# Patient Record
Sex: MALE | Race: BLACK OR AFRICAN AMERICAN | NOT HISPANIC OR LATINO | Employment: UNEMPLOYED | URBAN - METROPOLITAN AREA
[De-identification: names, ages, dates, MRNs, and addresses within clinical notes are randomized per-mention and may not be internally consistent; named-entity substitution may affect disease eponyms.]

---

## 2017-03-30 ENCOUNTER — ALLSCRIPTS OFFICE VISIT (OUTPATIENT)
Dept: OTHER | Facility: OTHER | Age: 42
End: 2017-03-30

## 2017-03-30 DIAGNOSIS — Z00.00 ENCOUNTER FOR GENERAL ADULT MEDICAL EXAMINATION WITHOUT ABNORMAL FINDINGS: ICD-10-CM

## 2017-03-30 DIAGNOSIS — Z13.6 ENCOUNTER FOR SCREENING FOR CARDIOVASCULAR DISORDERS: ICD-10-CM

## 2017-03-30 DIAGNOSIS — Z13.1 ENCOUNTER FOR SCREENING FOR DIABETES MELLITUS: ICD-10-CM

## 2017-03-31 ENCOUNTER — ALLSCRIPTS OFFICE VISIT (OUTPATIENT)
Dept: OTHER | Facility: OTHER | Age: 42
End: 2017-03-31

## 2017-08-08 ENCOUNTER — APPOINTMENT (EMERGENCY)
Dept: RADIOLOGY | Facility: HOSPITAL | Age: 42
End: 2017-08-08
Payer: COMMERCIAL

## 2017-08-08 ENCOUNTER — HOSPITAL ENCOUNTER (EMERGENCY)
Facility: HOSPITAL | Age: 42
Discharge: HOME | End: 2017-08-08
Attending: EMERGENCY MEDICINE | Admitting: EMERGENCY MEDICINE
Payer: COMMERCIAL

## 2017-08-08 VITALS
HEART RATE: 80 BPM | WEIGHT: 140 LBS | OXYGEN SATURATION: 97 % | RESPIRATION RATE: 16 BRPM | DIASTOLIC BLOOD PRESSURE: 64 MMHG | TEMPERATURE: 98.4 F | SYSTOLIC BLOOD PRESSURE: 131 MMHG

## 2017-08-08 DIAGNOSIS — R05.9 COUGH: ICD-10-CM

## 2017-08-08 DIAGNOSIS — J45.901 ASTHMA EXACERBATION: Primary | ICD-10-CM

## 2017-08-08 LAB
ANION GAP SERPL CALCULATED.3IONS-SCNC: 9 MMOL/L (ref 4–13)
BASOPHILS # BLD AUTO: 0.01 THOUSANDS/ΜL (ref 0–0.1)
BASOPHILS NFR BLD AUTO: 0 % (ref 0–1)
BUN SERPL-MCNC: 13 MG/DL (ref 5–25)
CALCIUM SERPL-MCNC: 8.9 MG/DL (ref 8.3–10.1)
CHLORIDE SERPL-SCNC: 100 MMOL/L (ref 100–108)
CO2 SERPL-SCNC: 29 MMOL/L (ref 21–32)
CREAT SERPL-MCNC: 1.09 MG/DL (ref 0.6–1.3)
EOSINOPHIL # BLD AUTO: 0.2 THOUSAND/ΜL (ref 0–0.61)
EOSINOPHIL NFR BLD AUTO: 2 % (ref 0–6)
ERYTHROCYTE [DISTWIDTH] IN BLOOD BY AUTOMATED COUNT: 14.4 % (ref 11.6–15.1)
GFR SERPL CREATININE-BSD FRML MDRD: 96 ML/MIN/1.73SQ M
GLUCOSE SERPL-MCNC: 109 MG/DL (ref 65–140)
HCT VFR BLD AUTO: 39.7 % (ref 36.5–49.3)
HGB BLD-MCNC: 14 G/DL (ref 12–17)
LYMPHOCYTES # BLD AUTO: 1.94 THOUSANDS/ΜL (ref 0.6–4.47)
LYMPHOCYTES NFR BLD AUTO: 23 % (ref 14–44)
MAGNESIUM SERPL-MCNC: 2 MG/DL (ref 1.6–2.6)
MCH RBC QN AUTO: 31.4 PG (ref 26.8–34.3)
MCHC RBC AUTO-ENTMCNC: 35.3 G/DL (ref 31.4–37.4)
MCV RBC AUTO: 89 FL (ref 82–98)
MONOCYTES # BLD AUTO: 0.79 THOUSAND/ΜL (ref 0.17–1.22)
MONOCYTES NFR BLD AUTO: 10 % (ref 4–12)
NEUTROPHILS # BLD AUTO: 5.39 THOUSANDS/ΜL (ref 1.85–7.62)
NEUTS SEG NFR BLD AUTO: 65 % (ref 43–75)
NRBC BLD AUTO-RTO: 0 /100 WBCS
PLATELET # BLD AUTO: 223 THOUSANDS/UL (ref 149–390)
PMV BLD AUTO: 10.4 FL (ref 8.9–12.7)
POTASSIUM SERPL-SCNC: 4.1 MMOL/L (ref 3.5–5.3)
RBC # BLD AUTO: 4.46 MILLION/UL (ref 3.88–5.62)
SODIUM SERPL-SCNC: 138 MMOL/L (ref 136–145)
WBC # BLD AUTO: 8.33 THOUSAND/UL (ref 4.31–10.16)

## 2017-08-08 PROCEDURE — 94640 AIRWAY INHALATION TREATMENT: CPT

## 2017-08-08 PROCEDURE — 99284 EMERGENCY DEPT VISIT MOD MDM: CPT

## 2017-08-08 PROCEDURE — 83735 ASSAY OF MAGNESIUM: CPT | Performed by: EMERGENCY MEDICINE

## 2017-08-08 PROCEDURE — 71020 HB CHEST X-RAY 2VW FRONTAL&LATL: CPT

## 2017-08-08 PROCEDURE — 85025 COMPLETE CBC W/AUTO DIFF WBC: CPT | Performed by: EMERGENCY MEDICINE

## 2017-08-08 PROCEDURE — 80048 BASIC METABOLIC PNL TOTAL CA: CPT | Performed by: EMERGENCY MEDICINE

## 2017-08-08 PROCEDURE — 96375 TX/PRO/DX INJ NEW DRUG ADDON: CPT

## 2017-08-08 PROCEDURE — 96374 THER/PROPH/DIAG INJ IV PUSH: CPT

## 2017-08-08 PROCEDURE — 36415 COLL VENOUS BLD VENIPUNCTURE: CPT | Performed by: EMERGENCY MEDICINE

## 2017-08-08 RX ORDER — BENZONATATE 100 MG/1
100 CAPSULE ORAL EVERY 8 HOURS
Qty: 21 CAPSULE | Refills: 0 | Status: SHIPPED | OUTPATIENT
Start: 2017-08-08 | End: 2018-03-13 | Stop reason: ALTCHOICE

## 2017-08-08 RX ORDER — PREDNISONE 20 MG/1
60 TABLET ORAL DAILY
Qty: 15 TABLET | Refills: 0 | Status: SHIPPED | OUTPATIENT
Start: 2017-08-08 | End: 2017-08-13

## 2017-08-08 RX ORDER — IPRATROPIUM BROMIDE AND ALBUTEROL SULFATE 2.5; .5 MG/3ML; MG/3ML
3 SOLUTION RESPIRATORY (INHALATION) ONCE
Status: COMPLETED | OUTPATIENT
Start: 2017-08-08 | End: 2017-08-08

## 2017-08-08 RX ORDER — DIPHENHYDRAMINE HYDROCHLORIDE 50 MG/ML
25 INJECTION INTRAMUSCULAR; INTRAVENOUS ONCE
Status: COMPLETED | OUTPATIENT
Start: 2017-08-08 | End: 2017-08-08

## 2017-08-08 RX ORDER — METHYLPREDNISOLONE SODIUM SUCCINATE 125 MG/2ML
125 INJECTION, POWDER, LYOPHILIZED, FOR SOLUTION INTRAMUSCULAR; INTRAVENOUS ONCE
Status: COMPLETED | OUTPATIENT
Start: 2017-08-08 | End: 2017-08-08

## 2017-08-08 RX ORDER — ALBUTEROL SULFATE 90 UG/1
2 AEROSOL, METERED RESPIRATORY (INHALATION) EVERY 6 HOURS PRN
COMMUNITY
End: 2018-01-29 | Stop reason: SDUPTHER

## 2017-08-08 RX ORDER — NAPROXEN 500 MG/1
500 TABLET ORAL 2 TIMES DAILY WITH MEALS
Qty: 10 TABLET | Refills: 0 | Status: SHIPPED | OUTPATIENT
Start: 2017-08-08 | End: 2018-07-09

## 2017-08-08 RX ORDER — METOCLOPRAMIDE HYDROCHLORIDE 5 MG/ML
10 INJECTION INTRAMUSCULAR; INTRAVENOUS ONCE
Status: COMPLETED | OUTPATIENT
Start: 2017-08-08 | End: 2017-08-08

## 2017-08-08 RX ORDER — KETOROLAC TROMETHAMINE 30 MG/ML
15 INJECTION, SOLUTION INTRAMUSCULAR; INTRAVENOUS ONCE
Status: COMPLETED | OUTPATIENT
Start: 2017-08-08 | End: 2017-08-08

## 2017-08-08 RX ADMIN — METOCLOPRAMIDE 10 MG: 5 INJECTION, SOLUTION INTRAMUSCULAR; INTRAVENOUS at 22:31

## 2017-08-08 RX ADMIN — DIPHENHYDRAMINE HYDROCHLORIDE 25 MG: 50 INJECTION, SOLUTION INTRAMUSCULAR; INTRAVENOUS at 22:27

## 2017-08-08 RX ADMIN — KETOROLAC TROMETHAMINE 15 MG: 30 INJECTION, SOLUTION INTRAMUSCULAR at 22:24

## 2017-08-08 RX ADMIN — METHYLPREDNISOLONE SODIUM SUCCINATE 125 MG: 125 INJECTION, POWDER, FOR SOLUTION INTRAMUSCULAR; INTRAVENOUS at 22:21

## 2017-08-08 RX ADMIN — IPRATROPIUM BROMIDE AND ALBUTEROL SULFATE 3 ML: .5; 3 SOLUTION RESPIRATORY (INHALATION) at 22:19

## 2018-01-12 NOTE — PROGRESS NOTES
Assessment    1  Encounter for preventive health examination (V70 0) (Z00 00)   2  Encounter for screening for diabetes mellitus (V77 1) (Z13 1)   3  Screening for cardiovascular condition (V81 2) (Z13 6)    Plan  Health Maintenance, Encounter for screening for diabetes mellitus    · (1) COMPREHENSIVE METABOLIC PANEL; Status:Active; Requested for:30Mar2017; Health Maintenance, Encounter for screening for diabetes mellitus, Screening for  cardiovascular condition    · (1) LIPID PANEL, FASTING; Status:Active; Requested for:30Mar2017;     Discussion/Summary  Impression: health maintenance visit  Currently, he eats a healthy diet and has an adequate exercise regimen  Prostate cancer screening: PSA is not indicated  Testicular cancer screening: the risks and benefits of testicular cancer screening were discussed and self testicular exam technique was taught  Colorectal cancer screening: colorectal cancer screening is not indicated  Screening lab work includes glucose and lipid profile  The immunizations are up to date  He was advised to be evaluated by a dentist  Advice and education were given regarding nutrition, aerobic exercise, sunscreen use, self skin examination and seat belt use  Pateint will come in tomorrow for PPD placement and then have it read on Monday  Chief Complaint  HERE TODAY FOR WELLNESS VISIT FOR [facility] AND [facility]      History of Present Illness  HM, Adult Male: The patient is being seen for a health maintenance evaluation  The last health maintenance visit was 2 year(s) ago  Social History: Household members include spouse and 3 kids  He is   Work status: working full time and occupation: security  The patient has never smoked cigarettes  He reports never drinking alcohol  He has never used illicit drugs  General Health: The patient's health since the last visit is described as good  He has regular dental visits  He denies vision problems   He denies hearing loss  Immunizations status: up to date  Lifestyle:  He consumes a diverse and healthy diet  He has weight concerns  He exercises regularly  He does not use tobacco  He consumes alcohol  He denies drug use  Reproductive health:  the patient is sexually active  Screening: Prostate cancer screening includes no previous evaluation  Testicular cancer screening includes monthly self testicular examinations  Colorectal cancer screening includes no previous screening  Metabolic screening includes no previous lipid profile and no previous glucose screening  HPI: Patient is in the process of adopting his wife's cousin's child They have been raising the child since 6 months of age and kid is now 1 Patient also needs a PE form for the [employer] he works at as a  Patient is overall doing well He is taking his asthma medicaitons without issues      Review of Systems    Constitutional: No fever or chills, feels well, no tiredness, no recent weight gain or weight loss  Eyes: no eye pain and no eyesight problems  ENT: no complaints of earache, no hearing loss, no nosebleeds, no nasal discharge, no sore throat, no hoarseness  Cardiovascular: no chest pain, no intermittent leg claudication, no palpitations and no extremity edema  Respiratory: No complaints of shortness of breath, no wheezing, no cough, no SOB on exertion, no orthopnea or PND  Gastrointestinal: No complaints of abdominal pain, no constipation, no nausea or vomiting, no diarrhea or bloody stools  Genitourinary: no dysuria and no incontinence  Musculoskeletal: no arthralgias and no myalgias  Integumentary: no rashes  Neurological: No compliants of headache, no confusion, no convulsions, no numbness or tingling, no dizziness or fainting, no limb weakness, no difficulty walking  Psychiatric: no anxiety, no sleep disturbances and no depression  Active Problems    1   Moderate persistent asthma (493 90) (J45 40)    Past Medical History    · History of Shoulder dislocation (831 00) (S43 006A)    Family History  Mother    · Family history of Asthma   · Family history of Drug abuse  Father    · Family history of Drug abuse    Social History    · Caffeine use (V49 89) (F15 90)   · 2 CUPS DAILY  · Never smoker   · No alcohol use    Current Meds   1  Advair Diskus 250-50 MCG/DOSE Inhalation Aerosol Powder Breath Activated; INHALE   1 PUFF TWICE DAILY  Requested for: 19Oct2016; Last Rx:19Oct2016 Ordered   2  Albuterol Sulfate (2 5 MG/3ML) 0 083% Inhalation Nebulization Solution; USE 1 UNIT   DOSE IN NEBULIZER EVERY 4 TO 6 HOURS AS NEEDED  Requested for:   [id number]; Last Rx:[id number] Ordered   3  Ventolin  (90 Base) MCG/ACT Inhalation Aerosol Solution; INHALE 2 PUFFS   EVERY 6 HOURS AS NEEDED COUGH AND WHEEZING; Therapy: [id number] to (Evaluate:28Jun2017)  Requested for: 20Mar2017; Last   Rx:20Mar2017 Ordered    Allergies    1  Influenza Virus Vaccine INJ    Vitals   Recorded: [id number] 03:46PM   Temperature 60 0 F   Systolic 319   Diastolic 70   Height 5 ft 9 in   Weight 165 lb    BMI Calculated 24 37   BSA Calculated 1 9     Physical Exam    Constitutional   General appearance: No acute distress, well appearing and well nourished  Head and Face   Head and face: Normal     Eyes   Conjunctiva and lids: No erythema, swelling or discharge  Pupils and irises: Equal, round, reactive to light  Ears, Nose, Mouth, and Throat   External inspection of ears and nose: Normal     Otoscopic examination: Tympanic membranes translucent with normal light reflex  Canals patent without erythema  Hearing: Normal     Nasal mucosa, septum, and turbinates: Normal without edema or erythema  Lips, teeth, and gums: Normal, good dentition  Oropharynx: Normal with no erythema, edema, exudate or lesions  Neck   Neck: Supple, symmetric, trachea midline, no masses  Thyroid: Normal, no thyromegaly      Pulmonary Respiratory effort: No increased work of breathing or signs of respiratory distress  Auscultation of lungs: Clear to auscultation  Cardiovascular   Auscultation of heart: Normal rate and rhythm, normal S1 and S2, no murmurs  Carotid pulses: 2+ bilaterally  Examination of extremities for edema and/or varicosities: Normal     Abdomen   Abdomen: Non-tender, no masses  Liver and spleen: No hepatomegaly or splenomegaly  Lymphatic   Palpation of lymph nodes in neck: No lymphadenopathy  Musculoskeletal   Gait and station: Normal     Skin   Skin and subcutaneous tissue: Normal without rashes or lesions  Neurologic   Cranial nerves: Cranial nerves 2-12 intact  Cortical function: Normal mental status  Reflexes: 2+ and symmetric  Sensation: No sensory loss  Coordination: Normal finger to nose and heel to shin  Psychiatric   Judgment and insight: Normal     Orientation to person, place and time: Normal     Recent and remote memory: Intact  Mood and affect: Normal        Procedure    Procedure: Visual Acuity Test    Indication: routine screening  Inforrmation supplied by a Snellen chart  Results: 20/30 in the right eye without corrective device, 20/40 in the left eye without corrective device   Color vision was screened with the Ishihara Test and the results were normal    The patient was cooperative, but tolerated the procedure well        Signatures   Electronically signed by : Angela Nino DO; Mar 30 2017  4:09PM EST                       (Author)

## 2018-01-13 VITALS
SYSTOLIC BLOOD PRESSURE: 130 MMHG | HEIGHT: 69 IN | BODY MASS INDEX: 24.44 KG/M2 | DIASTOLIC BLOOD PRESSURE: 70 MMHG | WEIGHT: 165 LBS | TEMPERATURE: 97.5 F

## 2018-01-16 NOTE — PROGRESS NOTES
Chief Complaint  Here for PPD test-Tubersol 0 1ml-intradermally-left forearm      Active Problems    1  Encounter for screening for diabetes mellitus (V77 1) (Z13 1)   2  Moderate persistent asthma (493 90) (J45 40)   3  Screening for cardiovascular condition (V81 2) (Z13 6)    Current Meds   1  Advair Diskus 250-50 MCG/DOSE Inhalation Aerosol Powder Breath Activated; INHALE   1 PUFF TWICE DAILY  Requested for: 19Oct2016; Last Rx:19Oct2016 Ordered   2  Albuterol Sulfate (2 5 MG/3ML) 0 083% Inhalation Nebulization Solution; USE 1 UNIT   DOSE IN NEBULIZER EVERY 4 TO 6 HOURS AS NEEDED  Requested for:   15Sep2015; Last Rx:[id number] Ordered   3  Ventolin  (90 Base) MCG/ACT Inhalation Aerosol Solution; INHALE 2 PUFFS   EVERY 6 HOURS AS NEEDED COUGH AND WHEEZING; Therapy: [id number] to (Evaluate:28Jun2017)  Requested for: 20Mar2017; Last   Rx:20Mar2017 Ordered    Allergies    1   Influenza Virus Vaccine INJ    Plan  Encounter for PPD test    · Tubersol 5 UNIT/0 1ML Intradermal Solution    Signatures   Electronically signed by : Francisco Martinez DO; Mar 31 2017  2:24PM EST                       (Author)

## 2018-01-29 DIAGNOSIS — J45.20 ASTHMA IN ADULT, MILD INTERMITTENT, UNCOMPLICATED: Primary | ICD-10-CM

## 2018-01-30 DIAGNOSIS — J45.20 ASTHMA IN ADULT, MILD INTERMITTENT, UNCOMPLICATED: ICD-10-CM

## 2018-02-09 DIAGNOSIS — J45.20 ASTHMA IN ADULT, MILD INTERMITTENT, UNCOMPLICATED: ICD-10-CM

## 2018-03-07 DIAGNOSIS — J45.20 ASTHMA IN ADULT, MILD INTERMITTENT, UNCOMPLICATED: ICD-10-CM

## 2018-03-07 NOTE — PROGRESS NOTES
History of Present Illness    Revaccination   Vaccine Information: Vaccine(s) Given (names): ADACEL  Unable to reach by phone  Spoke with patient regarding vaccine out of temperature range  Action(s): Revaccination declined  Pt called (attempt 1): [id number] 105 BL  Pt called (attempt 2): [id number] 1100 BL  Other Information: LEFT VOICE MAIL  Active Problems    1  Moderate persistent asthma (493 90) (J45 40)    Immunizations  Influenza --- Ravin Riggins: Temporarily Deferred: Pt refuses   PPD --- Ravin Louht: 21-Jan-2015     Current Meds   1  Advair Diskus 250-50 MCG/DOSE Inhalation Aerosol Powder Breath Activated; INHALE 1   PUFF TWICE DAILY   2  Albuterol Sulfate (2 5 MG/3ML) 0 083% Inhalation Nebulization Solution; USE 1 UNIT   DOSE IN NEBULIZER EVERY 4 TO 6 HOURS AS NEEDED   3  Ventolin  (90 Base) MCG/ACT Inhalation Aerosol Solution; INHALE 2 PUFFS   EVERY 6 HOURS AS NEEDED COUGH AND WHEEZING    Allergies    1  Influenza Virus Vaccine INJ    Plan    1   Tdap    Signatures   Electronically signed by : Bridget Galvan DO; Jan 17 2017 11:52AM EST                       (Author)

## 2018-03-13 ENCOUNTER — OFFICE VISIT (OUTPATIENT)
Dept: FAMILY MEDICINE CLINIC | Facility: CLINIC | Age: 43
End: 2018-03-13
Payer: COMMERCIAL

## 2018-03-13 VITALS — BODY MASS INDEX: 23.18 KG/M2 | WEIGHT: 157 LBS | SYSTOLIC BLOOD PRESSURE: 118 MMHG | DIASTOLIC BLOOD PRESSURE: 78 MMHG

## 2018-03-13 DIAGNOSIS — J45.40 MODERATE PERSISTENT ASTHMA WITHOUT COMPLICATION: Primary | ICD-10-CM

## 2018-03-13 DIAGNOSIS — J45.20 ASTHMA IN ADULT, MILD INTERMITTENT, UNCOMPLICATED: ICD-10-CM

## 2018-03-13 PROCEDURE — 99213 OFFICE O/P EST LOW 20 MIN: CPT | Performed by: FAMILY MEDICINE

## 2018-03-13 RX ORDER — ALBUTEROL SULFATE 90 UG/1
AEROSOL, METERED RESPIRATORY (INHALATION)
Qty: 18 INHALER | Refills: 0 | Status: SHIPPED | OUTPATIENT
Start: 2018-03-13 | End: 2018-04-18 | Stop reason: SDUPTHER

## 2018-03-13 NOTE — ASSESSMENT & PLAN NOTE
Patient asthma is well controlled He has only been needing the rescue inhaler about 1 time per week Patient declines flu shot patient will continue advair daily and use ventolin as needed

## 2018-03-13 NOTE — PATIENT INSTRUCTIONS
Asthma   AMBULATORY CARE:   Asthma  is a lung disease that makes breathing difficult  Chronic inflammation and reactions to triggers narrow the airways in your lungs  Asthma can become life-threatening if it is not managed  Cough-variant asthma  is a type of asthma that causes a dry cough that keeps coming back  A dry cough may be your only symptom, or you may also have chest tightness  These symptoms may be caused by exercise or exposure to odors, allergens, or respiratory tract infections  Cough-variant asthma is treated the same way as typical asthma  Common symptoms include the following:   · Coughing     · Wheezing     · Shortness of breath     · Chest tightness  Seek care immediately if:   · You have severe shortness of breath  · Your lips or nails turn blue or gray  · The skin around your neck and ribs pulls in with each breath  · You have shortness of breath, even after you take your short-term medicine as directed  · Your peak flow numbers are in the red zone of your AAP  Contact your healthcare provider if:   · You run out of medicine before your next refill is due  · Your symptoms get worse  · You need to take more medicine than usual to control your symptoms  · You have questions or concerns about your condition or care  Treatment for asthma  will depend on how severe your asthma is  Medicine may decrease inflammation, open airways, and make it easier to breathe  Medicines may be inhaled, taken as a pill, or injected  Short-term medicines relieve your symptoms quickly  Long-term medicines are used to prevent future attacks  You may also need medicine to help control your allergies  Manage and prevent future asthma attacks:   · Follow your asthma action plan  This is a written plan that you and your healthcare provider create  It explains which medicine you need and when to change doses if necessary   It also explains how you can monitor symptoms and use a peak flow meter  The meter measures how well your lungs are working  · Manage other health conditions , such as allergies, acid reflux, and sleep apnea  · Identify and avoid triggers  These may include pets, dust mites, mold, and cockroaches  · Do not smoke or be around others who smoke  Nicotine and other chemicals in cigarettes and cigars can cause lung damage  Ask your healthcare provider for information if you currently smoke and need help to quit  E-cigarettes or smokeless tobacco still contain nicotine  Talk to your healthcare provider before you use these products  · Ask about the flu vaccine  The flu can make your asthma worse  You may need a yearly flu shot  Follow up with your healthcare provider as directed: You will need to return to make sure your medicine is working and your symptoms are controlled  You may be referred to an asthma or allergy specialist  Liang Nava may be asked to keep a record of your peak flow values and bring it with you to your appointments  Write down your questions so you remember to ask them during your visits  © 2017 [address] Information is for End User's use only and may not be sold, redistributed or otherwise used for commercial purposes  All illustrations and images included in CareNotes® are the copyrighted property of [employer]  or [employer]  The above information is an  only  It is not intended as medical advice for individual conditions or treatments  Talk to your doctor, nurse or pharmacist before following any medical regimen to see if it is safe and effective for you

## 2018-03-13 NOTE — PROGRESS NOTES
Assessment/Plan: Moderate persistent asthma  Patient asthma is well controlled He has only been needing the rescue inhaler about 1 time per week Patient declines flu shot patient will continue advair daily and use ventolin as needed       Diagnoses and all orders for this visit:    Moderate persistent asthma without complication    Asthma in adult, mild intermittent, uncomplicated  -     albuterol (VENTOLIN HFA) 90 mcg/act inhaler; 2 puffs every 6 hours as needed for wheezing/coughing  -     fluticasone-salmeterol (ADVAIR DISKUS) 250-50 mcg/dose inhaler; Inhale 1 puff 2 (two) times a day    Other orders  -     Discontinue: fluticasone-salmeterol (ADVAIR DISKUS) 250-50 mcg/dose inhaler; Inhale 1 puff 2 (two) times a day          Subjective:   Chief Complaint   Patient presents with    Follow-up     asthma          Patient ID: Parrish Valencia is a 38 y o  male  Patient is here for followup of her asthma Patient has been doing well He is exercising often Patient is a resource officer at [employer] Patient has been using the rescue inhaler about 1 time per week patient is using advair daily patient refuses to have flu shot      Asthma   There is no chest tightness, cough, difficulty breathing, frequent throat clearing, hemoptysis, hoarse voice, shortness of breath, sputum production or wheezing  This is a recurrent problem  The current episode started more than 1 year ago  The problem occurs rarely  The problem has been unchanged  Pertinent negatives include no appetite change, chest pain, dyspnea on exertion, ear congestion, ear pain, fever, headaches, heartburn, malaise/fatigue, myalgias, nasal congestion, orthopnea, PND, postnasal drip, rhinorrhea, sneezing, sore throat, sweats, trouble swallowing or weight loss  His symptoms are aggravated by pollen and exposure to smoke  His symptoms are alleviated by beta-agonist  He reports significant improvement on treatment   There are no known risk factors for lung disease  His past medical history is significant for asthma  There is no history of bronchiectasis, bronchitis, COPD, emphysema or pneumonia  The following portions of the patient's history were reviewed and updated as appropriate: allergies, current medications, past social history and problem list     Review of Systems   Constitutional: Negative for appetite change, fatigue, fever, malaise/fatigue, unexpected weight change and weight loss  HENT: Negative for congestion, ear pain, hoarse voice, postnasal drip, rhinorrhea, sinus pain, sneezing, sore throat and trouble swallowing  Eyes: Negative for discharge and visual disturbance  Respiratory: Negative for cough, hemoptysis, sputum production, chest tightness, shortness of breath and wheezing  Cardiovascular: Negative for chest pain, dyspnea on exertion, palpitations, leg swelling and PND  Gastrointestinal: Negative for abdominal pain, blood in stool, constipation, diarrhea, heartburn, nausea and vomiting  Genitourinary: Negative for difficulty urinating, dysuria, frequency and hematuria  Musculoskeletal: Negative for arthralgias, gait problem, joint swelling and myalgias  Skin: Negative for rash and wound  Allergic/Immunologic: Negative for environmental allergies and food allergies  Neurological: Negative for dizziness, syncope, weakness, numbness and headaches  Hematological: Negative for adenopathy  Does not bruise/bleed easily  Psychiatric/Behavioral: Negative for confusion, decreased concentration and sleep disturbance  The patient is not nervous/anxious  Objective:      /78   Wt 71 2 kg (157 lb)   BMI 23 18 kg/m²          Physical Exam   Constitutional: He is oriented to person, place, and time  He appears well-developed and well-nourished  HENT:   Head: Normocephalic and atraumatic     Right Ear: Hearing, tympanic membrane and external ear normal    Left Ear: Hearing, tympanic membrane and external ear normal    Eyes: Conjunctivae and EOM are normal  Pupils are equal, round, and reactive to light  Neck: Neck supple  No thyromegaly present  Cardiovascular: Normal rate and normal heart sounds  Pulmonary/Chest: Effort normal and breath sounds normal  He has no wheezes  He has no rales  Abdominal: Soft  Bowel sounds are normal  He exhibits no distension  There is no tenderness  Musculoskeletal: He exhibits no edema or tenderness  Lymphadenopathy:     He has no cervical adenopathy  Neurological: He is alert and oriented to person, place, and time  No cranial nerve deficit  Coordination normal    Skin: Skin is warm and dry  No rash noted  Psychiatric: He has a normal mood and affect   His behavior is normal  Judgment and thought content normal

## 2018-04-18 DIAGNOSIS — J45.20 ASTHMA IN ADULT, MILD INTERMITTENT, UNCOMPLICATED: ICD-10-CM

## 2018-05-07 DIAGNOSIS — J45.20 ASTHMA IN ADULT, MILD INTERMITTENT, UNCOMPLICATED: ICD-10-CM

## 2018-05-08 DIAGNOSIS — J45.20 ASTHMA IN ADULT, MILD INTERMITTENT, UNCOMPLICATED: ICD-10-CM

## 2018-06-18 DIAGNOSIS — J45.20 ASTHMA IN ADULT, MILD INTERMITTENT, UNCOMPLICATED: ICD-10-CM

## 2018-07-09 ENCOUNTER — HOSPITAL ENCOUNTER (EMERGENCY)
Facility: HOSPITAL | Age: 43
Discharge: HOME | End: 2018-07-09
Attending: EMERGENCY MEDICINE
Payer: COMMERCIAL

## 2018-07-09 ENCOUNTER — APPOINTMENT (EMERGENCY)
Dept: RADIOLOGY | Facility: HOSPITAL | Age: 43
End: 2018-07-09
Payer: COMMERCIAL

## 2018-07-09 VITALS
OXYGEN SATURATION: 98 % | RESPIRATION RATE: 16 BRPM | SYSTOLIC BLOOD PRESSURE: 125 MMHG | HEART RATE: 76 BPM | DIASTOLIC BLOOD PRESSURE: 73 MMHG | TEMPERATURE: 97 F

## 2018-07-09 DIAGNOSIS — M24.411 SHOULDER DISLOCATION, RECURRENT, RIGHT: Primary | ICD-10-CM

## 2018-07-09 PROCEDURE — 96375 TX/PRO/DX INJ NEW DRUG ADDON: CPT

## 2018-07-09 PROCEDURE — 96374 THER/PROPH/DIAG INJ IV PUSH: CPT

## 2018-07-09 PROCEDURE — 73030 X-RAY EXAM OF SHOULDER: CPT

## 2018-07-09 PROCEDURE — 99283 EMERGENCY DEPT VISIT LOW MDM: CPT

## 2018-07-09 RX ORDER — FENTANYL CITRATE 50 UG/ML
100 INJECTION, SOLUTION INTRAMUSCULAR; INTRAVENOUS ONCE
Status: COMPLETED | OUTPATIENT
Start: 2018-07-09 | End: 2018-07-09

## 2018-07-09 RX ORDER — FENTANYL CITRATE 50 UG/ML
INJECTION, SOLUTION INTRAMUSCULAR; INTRAVENOUS
Status: DISPENSED
Start: 2018-07-09 | End: 2018-07-09

## 2018-07-09 RX ORDER — ALBUTEROL SULFATE 90 UG/1
AEROSOL, METERED RESPIRATORY (INHALATION)
COMMUNITY
Start: 2006-01-13 | End: 2018-08-01 | Stop reason: SDUPTHER

## 2018-07-09 RX ORDER — DIAZEPAM 5 MG/ML
5 INJECTION, SOLUTION INTRAMUSCULAR; INTRAVENOUS ONCE
Status: COMPLETED | OUTPATIENT
Start: 2018-07-09 | End: 2018-07-09

## 2018-07-09 RX ORDER — DIAZEPAM 5 MG/ML
INJECTION, SOLUTION INTRAMUSCULAR; INTRAVENOUS
Status: DISPENSED
Start: 2018-07-09 | End: 2018-07-09

## 2018-07-09 RX ADMIN — FENTANYL CITRATE 100 MCG: 50 INJECTION, SOLUTION INTRAMUSCULAR; INTRAVENOUS at 08:28

## 2018-07-09 RX ADMIN — DIAZEPAM 5 MG: 5 INJECTION, SOLUTION INTRAMUSCULAR; INTRAVENOUS at 08:39

## 2018-07-09 NOTE — ED PROVIDER NOTES
History  Chief Complaint   Patient presents with    Shoulder Pain     patient states was gurgling while brushing his teeth, leaned forward to spit, and dislocated right shoulder  Has h/o same     Patient presents with complaint of right shoulder pain and probable dislocation  He states that he was brushing his teeth just PTA and when he leaned forward to spit the shoulder dislocated  He has a history of this multiple times in the past   Complains of localized pain in his right shoulder only  Denies any other issues or complaints at this time  Prior to Admission Medications   Prescriptions Last Dose Informant Patient Reported? Taking? PROAIR  (90 Base) MCG/ACT inhaler   No No   Si PUFFS EVERY 6 HOURS AS NEEDED FOR WHEEZING/COUGHING   albuterol (PROVENTIL HFA,VENTOLIN HFA) 90 mcg/act inhaler   Yes Yes   Si puffs qid prn   fluticasone-salmeterol (ADVAIR DISKUS) 100-50 mcg/dose inhaler   Yes Yes   Si puffs qd   fluticasone-salmeterol (ADVAIR DISKUS) 250-50 mcg/dose inhaler   No No   Sig: Inhale 1 puff 2 (two) times a day      Facility-Administered Medications: None       Past Medical History:   Diagnosis Date    Asthma     Shoulder dislocation        History reviewed  No pertinent surgical history  Family History   Problem Relation Age of Onset    Asthma Mother     No Known Problems Father      I have reviewed and agree with the history as documented  Social History   Substance Use Topics    Smoking status: Never Smoker    Smokeless tobacco: Never Used    Alcohol use No        Review of Systems   Constitutional: Negative  HENT: Negative  Eyes: Negative  Respiratory: Negative  Cardiovascular: Negative  Gastrointestinal: Negative  Musculoskeletal: Positive for arthralgias (right shoulder)  Negative for back pain, gait problem, joint swelling and neck pain  Skin: Negative  Neurological: Negative  Hematological: Negative          Physical Exam  Physical Exam   Constitutional: He is oriented to person, place, and time  He appears well-developed and well-nourished  HENT:   Head: Normocephalic and atraumatic  Eyes: Pupils are equal, round, and reactive to light  Neck: Neck supple  Cardiovascular: Normal rate and regular rhythm  Pulmonary/Chest: Effort normal and breath sounds normal    Abdominal: Soft  Musculoskeletal: He exhibits deformity  Right shoulder: He exhibits decreased range of motion and tenderness  Arms:  Neurological: He is alert and oriented to person, place, and time  Skin: Skin is warm and dry  Capillary refill takes less than 2 seconds  Psychiatric: He has a normal mood and affect  Nursing note and vitals reviewed  Vital Signs  ED Triage Vitals [07/09/18 0813]   Temperature Pulse Respirations Blood Pressure SpO2   (!) 97 °F (36 1 °C) 82 (!) 28 140/87 98 %      Temp Source Heart Rate Source Patient Position - Orthostatic VS BP Location FiO2 (%)   Temporal Monitor Sitting Left arm --      Pain Score       Worst Possible Pain           Vitals:    07/09/18 0813 07/09/18 0839 07/09/18 0943   BP: 140/87 134/72 125/73   Pulse: 82 78 76   Patient Position - Orthostatic VS: Sitting         Visual Acuity      ED Medications  Medications   fentanyl citrate (PF) 100 MCG/2ML 100 mcg (100 mcg Intravenous Given 7/9/18 0828)   diazepam (VALIUM) injection 5 mg (5 mg Intravenous Given 7/9/18 0839)       Diagnostic Studies  Results Reviewed     None                 XR shoulder 2+ views RIGHT   Final Result by Kalyn Hurd MD (07/09 1000)      No acute osseous abnormality  Flattening of the lateral humeral head suggestive of old Hill-Sachs lesion              Workstation performed: [id number]                    Procedures  Orthopedic Injury  Date/Time: 7/9/2018 8:48 AM  Performed by: Shelia Vinson  Authorized by: Shelia Vinson     Patient Location:  ED  Other Assisting Provider: Yes (comment) Altaf Lopez Shira)    Verbal consent obtained?: Yes    Risks and benefits: Risks, benefits and alternatives were discussed    Consent given by:  Patient and spouse  Patient states understanding of procedure being performed: Yes    Patient's understanding of procedure matches consent: Yes    Patient identity confirmed:  Verbally with patient and arm band  Injury location:  Shoulder  Location details:  Right shoulder  Injury type:  Dislocation  Dislocation type: anterior    Chronicity:  Recurrent  Neurovascular status: Neurovascularly intact    Distal perfusion: normal    Neurological function: normal    Range of motion: reduced    Local anesthesia used?: No    General anesthesia used?: No    Sedation type: Anxiolysis  Manipulation performed?: Yes    Reduction method:  Scapular manipulation and traction and counter traction  Reduction method:  Scapular manipulation and traction and counter traction  Reduction method:  Scapular manipulation and traction and counter traction  Reduction method:  Scapular manipulation and traction and counter traction  Reduction method:  Scapular manipulation and traction and counter traction  Reduction method:  Scapular manipulation and traction and counter traction  Reduction successful?: Yes    Confirmation: Reduction confirmed by x-ray    Immobilization:  Other (comment) (shoulder immobilizer)  Neurovascular status: Neurovascularly intact    Distal perfusion: normal    Neurological function: normal    Patient tolerance:  Patient tolerated the procedure well with no immediate complications           Phone Contacts  ED Phone Contact    ED Course  ED Course as of Jul 09 1116   Mon Jul 09, 2018   7656 Patient continues to wake up after receiving the fentanyl and valium  Vitals are stable  Discussed the importance of Ortho follow-up  The patient reports that he has been told past to have surgery but has chosen not to    I informed him that he should follow up regardless that he could explore alternative surgical measures that a now persisted and not in the past   His wife is present and will ensure that he does follow up appropriately  [facility]  CritCare Time    Disposition  Final diagnoses:   Shoulder dislocation, recurrent, right     Time reflects when diagnosis was documented in both MDM as applicable and the Disposition within this note     Time User Action Codes Description Comment    7/9/2018  9:56 AM Juan J Foster Add [C86 [id number]] Shoulder dislocation, recurrent, right       ED Disposition     ED Disposition Condition Comment    Discharge  Rob Sosa discharge to home/self care  Condition at discharge: Good        Follow-up Information     Follow up With Specialties Details Why Contact Info       You must follow up with ortho as discussed  Discharge Medication List as of 7/9/2018 10:01 AM      CONTINUE these medications which have NOT CHANGED    Details   !! albuterol (PROVENTIL HFA,VENTOLIN HFA) 90 mcg/act inhaler 2 puffs qid prn, Historical Med      fluticasone-salmeterol (ADVAIR DISKUS) 100-50 mcg/dose inhaler 2 puffs qd, Historical Med      fluticasone-salmeterol (ADVAIR DISKUS) 250-50 mcg/dose inhaler Inhale 1 puff 2 (two) times a day, Starting Tue 3/13/2018, Normal      !! PROAIR  (90 Base) MCG/ACT inhaler 2 PUFFS EVERY 6 HOURS AS NEEDED FOR WHEEZING/COUGHING, Normal       !! - Potential duplicate medications found  Please discuss with provider  No discharge procedures on file      ED Provider  Electronically Signed by           Bertin Desir DO  07/09/18 0288

## 2018-07-09 NOTE — DISCHARGE INSTRUCTIONS
Shoulder Dislocation   WHAT YOU NEED TO KNOW:   A shoulder dislocation occurs when the top of your arm bone (humerus) moves out of the socket in your shoulder blade  DISCHARGE INSTRUCTIONS:   Medicines:  · Pain medicine: You may be given a prescription medicine to decrease pain  Do not wait until the pain is severe before you take this medicine  · Muscle relaxer: This helps the tight muscles in your shoulder relax  · Take your medicine as directed  Contact your healthcare provider if you think your medicine is not helping or if you have side effects  Tell him of her if you are allergic to any medicine  Keep a list of the medicines, vitamins, and herbs you take  Include the amounts, and when and why you take them  Bring the list or the pill bottles to follow-up visits  Carry your medicine list with you in case of an emergency  Follow up with your healthcare provider or bone specialist in 5 to 10 days:  Write down your questions so you remember to ask them during your visits  Activity:  You may need to rest your injured shoulder and arm, and avoid activities that cause you pain  Rest your shoulder and arm to help your muscles and tissues heal  Your healthcare provider may have you limit your shoulder movement for up to 6 weeks  After 6 weeks, you may be told to slowly increase your activities  It may take 4 to 6 months for your shoulder to heal completely  You may need to wait at least 6 months before you return to your usual activities  Rest your shoulder as directed  How to wear a brace, sling, or splint:  A brace, sling, or splint may be needed to limit your movement and protect your injured shoulder  · Wear your brace, sling, or splint all the time  Take it off only to bathe or do exercises as directed  Ask your healthcare provider or bone specialist how many weeks you should wear it  · Keep your skin clean and dry   Place padding under your armpit to help absorb sweat and prevent sores on your skin  · Do not hunch your shoulders  This may cause pain  Keep your shoulders relaxed  · Support your wrist and hand with the sling  Cover the knuckles on your hand with your sling  Your wrist should be positioned higher than your elbow  Your wrist may start to hurt or go numb if your sling is too short  Ice:  Ice helps decrease swelling and pain  Ice may also help prevent tissue damage  Use an ice pack, or put crushed ice in a plastic bag  Cover it with a towel and place it on your shoulder for 15 to 20 minutes every hour or as directed  Exercises:  Begin gentle exercises as directed  After healing begins, you may start light exercises so your shoulder does not get stiff  Physical therapy also may be ordered for you  A physical therapist teaches you exercises to help improve movement and strength, and to decrease pain  Do not lift heavy objects or do any exercise that causes severe pain  Contact your healthcare provider or bone specialist if:   · You have a fever  · You have increased pain in your injured shoulder and arm even after you rest and take your medicine  · You have new weakness or numbness in your injured shoulder and arm  · You have questions or concerns about your condition or care  Return to the emergency department if:   · Your injured shoulder and arm become pale or cold  · You cannot move your injured shoulder and arm  · You have increased redness or swelling in your injured shoulder  · Your shoulder becomes dislocated again  © 2017 [employer] Information is for End User's use only and may not be sold, redistributed or otherwise used for commercial purposes  All illustrations and images included in CareNotes® are the copyrighted property of A D A M , Inc  or Madi Del Rio  The above information is an  only  It is not intended as medical advice for individual conditions or treatments   Talk to your doctor, nurse or pharmacist before following any medical regimen to see if it is safe and effective for you

## 2018-07-14 DIAGNOSIS — J45.20 ASTHMA IN ADULT, MILD INTERMITTENT, UNCOMPLICATED: ICD-10-CM

## 2018-08-01 DIAGNOSIS — J45.20 ASTHMA IN ADULT, MILD INTERMITTENT, UNCOMPLICATED: ICD-10-CM

## 2018-08-16 DIAGNOSIS — J45.20 ASTHMA IN ADULT, MILD INTERMITTENT, UNCOMPLICATED: ICD-10-CM

## 2018-09-22 ENCOUNTER — HOSPITAL ENCOUNTER (INPATIENT)
Facility: HOSPITAL | Age: 43
LOS: 9 days | Discharge: HOME | DRG: 392 | End: 2018-10-01
Attending: EMERGENCY MEDICINE | Admitting: SURGERY
Payer: COMMERCIAL

## 2018-09-22 ENCOUNTER — APPOINTMENT (EMERGENCY)
Dept: CT IMAGING | Facility: HOSPITAL | Age: 43
DRG: 392 | End: 2018-09-22
Payer: COMMERCIAL

## 2018-09-22 DIAGNOSIS — K57.20 PERFORATION OF SIGMOID COLON DUE TO DIVERTICULITIS: Primary | ICD-10-CM

## 2018-09-22 DIAGNOSIS — K56.609 SMALL BOWEL OBSTRUCTION (HCC): ICD-10-CM

## 2018-09-22 DIAGNOSIS — J45.40 MODERATE PERSISTENT ASTHMA WITHOUT COMPLICATION: ICD-10-CM

## 2018-09-22 LAB
ALBUMIN SERPL BCP-MCNC: 4.8 G/DL (ref 3–5.2)
ALP SERPL-CCNC: 84 U/L (ref 43–122)
ALT SERPL W P-5'-P-CCNC: 39 U/L (ref 9–52)
ANION GAP SERPL CALCULATED.3IONS-SCNC: 14 MMOL/L (ref 5–14)
AST SERPL W P-5'-P-CCNC: 32 U/L (ref 17–59)
BASOPHILS # BLD AUTO: 0 THOUSANDS/ΜL (ref 0–0.1)
BASOPHILS NFR BLD AUTO: 0 % (ref 0–1)
BILIRUB SERPL-MCNC: 1 MG/DL
BUN SERPL-MCNC: 20 MG/DL (ref 5–25)
CALCIUM SERPL-MCNC: 9.4 MG/DL (ref 8.4–10.2)
CHLORIDE SERPL-SCNC: 96 MMOL/L (ref 97–108)
CO2 SERPL-SCNC: 25 MMOL/L (ref 22–30)
CREAT SERPL-MCNC: 1.17 MG/DL (ref 0.7–1.5)
EOSINOPHIL # BLD AUTO: 0 THOUSAND/ΜL (ref 0–0.4)
EOSINOPHIL NFR BLD AUTO: 0 % (ref 0–6)
ERYTHROCYTE [DISTWIDTH] IN BLOOD BY AUTOMATED COUNT: 15.1 %
GFR SERPL CREATININE-BSD FRML MDRD: 87 ML/MIN/1.73SQ M
GLUCOSE SERPL-MCNC: 115 MG/DL (ref 70–99)
HCT VFR BLD AUTO: 45.3 % (ref 41–53)
HGB BLD-MCNC: 15 G/DL (ref 13.5–17.5)
LIPASE SERPL-CCNC: 29 U/L (ref 23–300)
LYMPHOCYTES # BLD AUTO: 1 THOUSANDS/ΜL (ref 0.5–4)
LYMPHOCYTES NFR BLD AUTO: 7 % (ref 20–50)
MCH RBC QN AUTO: 30.5 PG (ref 26–34)
MCHC RBC AUTO-ENTMCNC: 33.1 G/DL (ref 31–36)
MCV RBC AUTO: 92 FL (ref 80–100)
MONOCYTES # BLD AUTO: 0.6 THOUSAND/ΜL (ref 0.2–0.9)
MONOCYTES NFR BLD AUTO: 4 % (ref 1–10)
NEUTROPHILS # BLD AUTO: 13.1 THOUSANDS/ΜL (ref 1.8–7.8)
NEUTS SEG NFR BLD AUTO: 89 % (ref 45–65)
PLATELET # BLD AUTO: 319 THOUSANDS/UL (ref 150–450)
PMV BLD AUTO: 8.4 FL (ref 8.9–12.7)
POTASSIUM SERPL-SCNC: 4 MMOL/L (ref 3.6–5)
PROT SERPL-MCNC: 8.8 G/DL (ref 5.9–8.4)
RBC # BLD AUTO: 4.92 MILLION/UL (ref 4.5–5.9)
SODIUM SERPL-SCNC: 135 MMOL/L (ref 137–147)
TROPONIN I SERPL-MCNC: <0.01 NG/ML (ref 0–0.03)
WBC # BLD AUTO: 14.7 THOUSAND/UL (ref 4.5–11)

## 2018-09-22 PROCEDURE — 80053 COMPREHEN METABOLIC PANEL: CPT | Performed by: EMERGENCY MEDICINE

## 2018-09-22 PROCEDURE — 81001 URINALYSIS AUTO W/SCOPE: CPT | Performed by: EMERGENCY MEDICINE

## 2018-09-22 PROCEDURE — 85025 COMPLETE CBC W/AUTO DIFF WBC: CPT | Performed by: EMERGENCY MEDICINE

## 2018-09-22 PROCEDURE — 36415 COLL VENOUS BLD VENIPUNCTURE: CPT | Performed by: EMERGENCY MEDICINE

## 2018-09-22 PROCEDURE — 96375 TX/PRO/DX INJ NEW DRUG ADDON: CPT

## 2018-09-22 PROCEDURE — 96361 HYDRATE IV INFUSION ADD-ON: CPT

## 2018-09-22 PROCEDURE — 74177 CT ABD & PELVIS W/CONTRAST: CPT

## 2018-09-22 PROCEDURE — 99285 EMERGENCY DEPT VISIT HI MDM: CPT

## 2018-09-22 PROCEDURE — 96374 THER/PROPH/DIAG INJ IV PUSH: CPT

## 2018-09-22 PROCEDURE — 83690 ASSAY OF LIPASE: CPT | Performed by: EMERGENCY MEDICINE

## 2018-09-22 PROCEDURE — 84484 ASSAY OF TROPONIN QUANT: CPT | Performed by: EMERGENCY MEDICINE

## 2018-09-22 PROCEDURE — 93005 ELECTROCARDIOGRAM TRACING: CPT

## 2018-09-22 RX ORDER — MORPHINE SULFATE 10 MG/ML
6 INJECTION, SOLUTION INTRAMUSCULAR; INTRAVENOUS ONCE
Status: COMPLETED | OUTPATIENT
Start: 2018-09-22 | End: 2018-09-22

## 2018-09-22 RX ORDER — ONDANSETRON 2 MG/ML
4 INJECTION INTRAMUSCULAR; INTRAVENOUS ONCE
Status: COMPLETED | OUTPATIENT
Start: 2018-09-22 | End: 2018-09-22

## 2018-09-22 RX ORDER — LEVOFLOXACIN 5 MG/ML
750 INJECTION, SOLUTION INTRAVENOUS ONCE
Status: COMPLETED | OUTPATIENT
Start: 2018-09-22 | End: 2018-09-23

## 2018-09-22 RX ORDER — CIPROFLOXACIN 2 MG/ML
400 INJECTION, SOLUTION INTRAVENOUS ONCE
Status: DISCONTINUED | OUTPATIENT
Start: 2018-09-22 | End: 2018-09-22

## 2018-09-22 RX ORDER — SODIUM CHLORIDE 9 MG/ML
125 INJECTION, SOLUTION INTRAVENOUS CONTINUOUS
Status: DISCONTINUED | OUTPATIENT
Start: 2018-09-22 | End: 2018-09-23

## 2018-09-22 RX ORDER — MORPHINE SULFATE 10 MG/ML
INJECTION, SOLUTION INTRAMUSCULAR; INTRAVENOUS
Status: COMPLETED
Start: 2018-09-22 | End: 2018-09-22

## 2018-09-22 RX ORDER — ONDANSETRON 2 MG/ML
INJECTION INTRAMUSCULAR; INTRAVENOUS
Status: COMPLETED
Start: 2018-09-22 | End: 2018-09-22

## 2018-09-22 RX ORDER — MIDAZOLAM HYDROCHLORIDE 1 MG/ML
3 INJECTION INTRAMUSCULAR; INTRAVENOUS ONCE
Status: COMPLETED | OUTPATIENT
Start: 2018-09-22 | End: 2018-09-23

## 2018-09-22 RX ORDER — MIDAZOLAM HYDROCHLORIDE 5 MG/ML
INJECTION INTRAMUSCULAR; INTRAVENOUS
Status: DISPENSED
Start: 2018-09-22 | End: 2018-09-23

## 2018-09-22 RX ADMIN — MORPHINE SULFATE 6 MG: 10 INJECTION, SOLUTION INTRAMUSCULAR; INTRAVENOUS at 21:34

## 2018-09-22 RX ADMIN — ONDANSETRON 4 MG: 2 INJECTION, SOLUTION INTRAMUSCULAR; INTRAVENOUS at 21:38

## 2018-09-22 RX ADMIN — IOHEXOL 100 ML: 350 INJECTION, SOLUTION INTRAVENOUS at 22:40

## 2018-09-22 RX ADMIN — MORPHINE SULFATE 6 MG: 10 INJECTION INTRAVENOUS at 21:34

## 2018-09-22 RX ADMIN — SODIUM CHLORIDE 125 ML/HR: 9 INJECTION, SOLUTION INTRAVENOUS at 21:34

## 2018-09-22 RX ADMIN — ONDANSETRON 4 MG: 2 INJECTION INTRAMUSCULAR; INTRAVENOUS at 21:38

## 2018-09-23 ENCOUNTER — APPOINTMENT (INPATIENT)
Dept: RADIOLOGY | Facility: HOSPITAL | Age: 43
DRG: 392 | End: 2018-09-23
Payer: COMMERCIAL

## 2018-09-23 PROBLEM — K57.20 PERFORATION OF SIGMOID COLON DUE TO DIVERTICULITIS: Status: ACTIVE | Noted: 2018-09-23

## 2018-09-23 PROBLEM — R10.9 ABDOMINAL PAIN: Status: ACTIVE | Noted: 2018-09-23

## 2018-09-23 PROBLEM — K56.609 SMALL BOWEL OBSTRUCTION (HCC): Status: ACTIVE | Noted: 2018-09-23

## 2018-09-23 LAB
ANION GAP SERPL CALCULATED.3IONS-SCNC: 10 MMOL/L (ref 5–14)
APTT PPP: 34 SECONDS (ref 23–34)
ATRIAL RATE: 89 BPM
BACTERIA UR QL AUTO: ABNORMAL /HPF
BILIRUB UR QL STRIP: NEGATIVE
BUN SERPL-MCNC: 16 MG/DL (ref 5–25)
CALCIUM SERPL-MCNC: 8.2 MG/DL (ref 8.4–10.2)
CHLORIDE SERPL-SCNC: 103 MMOL/L (ref 97–108)
CLARITY UR: CLEAR
CO2 SERPL-SCNC: 23 MMOL/L (ref 22–30)
COLOR UR: YELLOW
CREAT SERPL-MCNC: 0.9 MG/DL (ref 0.7–1.5)
ERYTHROCYTE [DISTWIDTH] IN BLOOD BY AUTOMATED COUNT: 14.9 %
GFR SERPL CREATININE-BSD FRML MDRD: 119 ML/MIN/1.73SQ M
GLUCOSE SERPL-MCNC: 91 MG/DL (ref 70–99)
GLUCOSE UR STRIP-MCNC: NEGATIVE MG/DL
HCT VFR BLD AUTO: 35.8 % (ref 41–53)
HGB BLD-MCNC: 11.9 G/DL (ref 13.5–17.5)
HGB UR QL STRIP.AUTO: NEGATIVE
INR PPP: 1.23 (ref 0.89–1.1)
KETONES UR STRIP-MCNC: ABNORMAL MG/DL
LACTATE SERPL-SCNC: 0.8 MMOL/L (ref 0.7–2)
LEUKOCYTE ESTERASE UR QL STRIP: NEGATIVE
MCH RBC QN AUTO: 30.6 PG (ref 26–34)
MCHC RBC AUTO-ENTMCNC: 33.3 G/DL (ref 31–36)
MCV RBC AUTO: 92 FL (ref 80–100)
MUCOUS THREADS UR QL AUTO: ABNORMAL
NITRITE UR QL STRIP: NEGATIVE
NON-SQ EPI CELLS URNS QL MICRO: ABNORMAL /HPF
P AXIS: 71 DEGREES
PH UR STRIP.AUTO: 5 [PH] (ref 4.5–8)
PLATELET # BLD AUTO: 247 THOUSANDS/UL (ref 150–450)
PMV BLD AUTO: 8.3 FL (ref 8.9–12.7)
POTASSIUM SERPL-SCNC: 3.9 MMOL/L (ref 3.6–5)
PR INTERVAL: 132 MS
PROT UR STRIP-MCNC: ABNORMAL MG/DL
PROTHROMBIN TIME: 12.9 SECONDS (ref 9.5–11.6)
QRS AXIS: 12 DEGREES
QRSD INTERVAL: 98 MS
QT INTERVAL: 368 MS
QTC INTERVAL: 447 MS
RBC # BLD AUTO: 3.89 MILLION/UL (ref 4.5–5.9)
RBC #/AREA URNS AUTO: ABNORMAL /HPF
SODIUM SERPL-SCNC: 136 MMOL/L (ref 137–147)
SP GR UR STRIP.AUTO: 1.01 (ref 1–1.04)
T WAVE AXIS: 32 DEGREES
UROBILINOGEN UA: 1 MG/DL
VENTRICULAR RATE: 89 BPM
WBC # BLD AUTO: 13.1 THOUSAND/UL (ref 4.5–11)
WBC #/AREA URNS AUTO: ABNORMAL /HPF

## 2018-09-23 PROCEDURE — 85730 THROMBOPLASTIN TIME PARTIAL: CPT | Performed by: INTERNAL MEDICINE

## 2018-09-23 PROCEDURE — 85610 PROTHROMBIN TIME: CPT | Performed by: INTERNAL MEDICINE

## 2018-09-23 PROCEDURE — 99253 IP/OBS CNSLTJ NEW/EST LOW 45: CPT | Performed by: INTERNAL MEDICINE

## 2018-09-23 PROCEDURE — 74022 RADEX COMPL AQT ABD SERIES: CPT

## 2018-09-23 PROCEDURE — 83605 ASSAY OF LACTIC ACID: CPT | Performed by: INTERNAL MEDICINE

## 2018-09-23 PROCEDURE — 85027 COMPLETE CBC AUTOMATED: CPT | Performed by: INTERNAL MEDICINE

## 2018-09-23 PROCEDURE — 93010 ELECTROCARDIOGRAM REPORT: CPT | Performed by: INTERNAL MEDICINE

## 2018-09-23 PROCEDURE — 80048 BASIC METABOLIC PNL TOTAL CA: CPT | Performed by: INTERNAL MEDICINE

## 2018-09-23 RX ORDER — LEVOFLOXACIN 5 MG/ML
INJECTION, SOLUTION INTRAVENOUS
Status: COMPLETED
Start: 2018-09-23 | End: 2018-09-23

## 2018-09-23 RX ORDER — CIPROFLOXACIN 2 MG/ML
400 INJECTION, SOLUTION INTRAVENOUS EVERY 12 HOURS
Status: DISCONTINUED | OUTPATIENT
Start: 2018-09-23 | End: 2018-09-30

## 2018-09-23 RX ORDER — MORPHINE SULFATE 4 MG/ML
4 INJECTION, SOLUTION INTRAMUSCULAR; INTRAVENOUS
Status: DISCONTINUED | OUTPATIENT
Start: 2018-09-23 | End: 2018-09-23

## 2018-09-23 RX ORDER — MORPHINE SULFATE 2 MG/ML
2 INJECTION, SOLUTION INTRAMUSCULAR; INTRAVENOUS EVERY 4 HOURS PRN
Status: DISCONTINUED | OUTPATIENT
Start: 2018-09-23 | End: 2018-09-24

## 2018-09-23 RX ORDER — ONDANSETRON 2 MG/ML
4 INJECTION INTRAMUSCULAR; INTRAVENOUS ONCE
Status: COMPLETED | OUTPATIENT
Start: 2018-09-23 | End: 2018-09-29

## 2018-09-23 RX ORDER — SODIUM CHLORIDE, SODIUM LACTATE, POTASSIUM CHLORIDE, CALCIUM CHLORIDE 600; 310; 30; 20 MG/100ML; MG/100ML; MG/100ML; MG/100ML
125 INJECTION, SOLUTION INTRAVENOUS CONTINUOUS
Status: DISCONTINUED | OUTPATIENT
Start: 2018-09-23 | End: 2018-09-30

## 2018-09-23 RX ADMIN — MORPHINE SULFATE 4 MG: 4 INJECTION INTRAVENOUS at 09:26

## 2018-09-23 RX ADMIN — SODIUM CHLORIDE, SODIUM LACTATE, POTASSIUM CHLORIDE, AND CALCIUM CHLORIDE 125 ML/HR: 600; 310; 30; 20 INJECTION, SOLUTION INTRAVENOUS at 22:08

## 2018-09-23 RX ADMIN — MORPHINE SULFATE 2 MG: 2 INJECTION, SOLUTION INTRAMUSCULAR; INTRAVENOUS at 15:23

## 2018-09-23 RX ADMIN — MIDAZOLAM 3 MG: 1 INJECTION INTRAMUSCULAR; INTRAVENOUS at 00:11

## 2018-09-23 RX ADMIN — METRONIDAZOLE 500 MG: 500 INJECTION, SOLUTION INTRAVENOUS at 09:08

## 2018-09-23 RX ADMIN — SODIUM CHLORIDE, SODIUM LACTATE, POTASSIUM CHLORIDE, AND CALCIUM CHLORIDE 125 ML/HR: 600; 310; 30; 20 INJECTION, SOLUTION INTRAVENOUS at 12:44

## 2018-09-23 RX ADMIN — SODIUM CHLORIDE, SODIUM LACTATE, POTASSIUM CHLORIDE, AND CALCIUM CHLORIDE 125 ML/HR: 600; 310; 30; 20 INJECTION, SOLUTION INTRAVENOUS at 02:54

## 2018-09-23 RX ADMIN — CIPROFLOXACIN 400 MG: 2 INJECTION, SOLUTION INTRAVENOUS at 10:43

## 2018-09-23 RX ADMIN — METRONIDAZOLE 500 MG: 500 INJECTION, SOLUTION INTRAVENOUS at 01:09

## 2018-09-23 RX ADMIN — LEVOFLOXACIN 750 MG: 750 INJECTION, SOLUTION INTRAVENOUS at 01:13

## 2018-09-23 RX ADMIN — METRONIDAZOLE 500 MG: 500 INJECTION, SOLUTION INTRAVENOUS at 16:22

## 2018-09-23 RX ADMIN — SODIUM CHLORIDE 1000 ML: 9 INJECTION, SOLUTION INTRAVENOUS at 01:14

## 2018-09-23 RX ADMIN — MORPHINE SULFATE 4 MG: 4 INJECTION INTRAVENOUS at 05:49

## 2018-09-23 RX ADMIN — MORPHINE SULFATE 2 MG: 2 INJECTION, SOLUTION INTRAMUSCULAR; INTRAVENOUS at 12:44

## 2018-09-23 RX ADMIN — MORPHINE SULFATE 2 MG: 2 INJECTION, SOLUTION INTRAMUSCULAR; INTRAVENOUS at 20:05

## 2018-09-23 RX ADMIN — LEVOFLOXACIN 750 MG: 5 INJECTION, SOLUTION INTRAVENOUS at 01:13

## 2018-09-23 RX ADMIN — CIPROFLOXACIN 400 MG: 2 INJECTION, SOLUTION INTRAVENOUS at 20:04

## 2018-09-23 NOTE — H&P
H&P Exam - General Surgery   Guy Sheldon 37 y o  male MRN: [id number]  Unit/Bed#: 7T [facility] 710-02 Encounter: [id number]    Assessment/Plan     Assessment:  Acut e Sigmoid divetriculitis wit microperfoation   Plan:  NGT,  IV Antibiotics  History of Present Illness   History, ROS and PFSH unobtainable from any source due to None  HPI:  Guy Sheldon is a 37 y o  male who presents with LLQ pain for one day  First episode of this type  +N/V  Henery Jurist Review of Systems   Gastrointestinal: Positive for abdominal distention, abdominal pain, nausea and vomiting  All other systems reviewed and are negative  Historical Information   Past Medical History:   Diagnosis Date    Asthma     Shoulder dislocation      History reviewed  No pertinent surgical history    Social History   History   Alcohol Use No     History   Drug Use No     History   Smoking Status    Never Smoker   Smokeless Tobacco    Never Used     Family History: non-contributory    Meds/Allergies   all medications and allergies reviewed  No Known Allergies    Objective   First Vitals:   Blood Pressure: (!) 173/89 (09/22/18 2106)  Pulse: (!) 109 (09/22/18 2106)  Temperature: 98 3 °F (36 8 °C) (09/22/18 2106)  Temp Source: Temporal (09/22/18 2106)  Respirations: 18 (09/22/18 2106)  Height: 5' 9" (175 3 cm) (09/23/18 0300)  Weight - Scale: 77 1 kg (170 lb) (09/22/18 2106)  SpO2: 94 % (09/22/18 2106)    Current Vitals:   Blood Pressure: 129/66 (09/23/18 0800)  Pulse: 81 (09/23/18 0800)  Temperature: (!) 97 1 °F (36 2 °C) (09/23/18 0800)  Temp Source: Temporal (09/23/18 0800)  Respirations: 16 (09/23/18 0800)  Height: 5' 9" (175 3 cm) (09/23/18 0300)  Weight - Scale: 71 4 kg (157 lb 6 5 oz) (09/23/18 0300)  SpO2: 95 % (09/23/18 0800)      Intake/Output Summary (Last 24 hours) at 09/23/18 1027  Last data filed at 09/23/18 1003   Gross per 24 hour   Intake             2000 ml   Output              900 ml   Net             1100 ml       Invasive Devices Peripheral Intravenous Line            Peripheral IV 09/22/18 Left Antecubital less than 1 day          Drain            NG/OG/Enteral Tube Nasogastric 16 Fr Left nares less than 1 day                Physical Exam   Constitutional: He is oriented to person, place, and time  He appears well-developed and well-nourished  No distress  Neck: Normal range of motion  Neck supple  Tracheal deviation present  Thyromegaly present  Cardiovascular: Normal rate and regular rhythm  Pulmonary/Chest: Effort normal and breath sounds normal    Abdominal: He exhibits distension  There is tenderness (LLQ)  There is guarding  Musculoskeletal: He exhibits edema and tenderness  Neurological: He is alert and oriented to person, place, and time  Skin: Skin is warm and dry  He is not diaphoretic  Lab Results: I have personally reviewed pertinent lab results  Imaging: I have personally reviewed pertinent reports  EKG, Pathology, and Other Studies: I have personally reviewed pertinent reports  Code Status: Level 1 - Full Code  Advance Directive and Living Will:      Power of :    POLST:      Counseling / Coordination of Care  Total floor / unit time spent today 20 minutes  Greater than 50% of total time was spent with the patient and / or family counseling and / or coordination of care  A description of the counseling / coordination of care: IV antibiotics  NGT  IV fluid Hydration  Shakir Dutta

## 2018-09-23 NOTE — CASE MANAGEMENT
[address] [place] [address] in the [facility] by Madi Del Rio for 2017  Network Utilization Review Department  Phone: [phone]; Fax [phone]  ATTENTION: The Network Utilization Review Department is now centralized for our 7 Facilities  Please call with any questions or concerns to [phone] and carefully follow the prompts so that you are directed to the right person  All voicemails are confidential  Fax any determinations, approvals, denials, and requests for initial or continue stay review clinical to [phone]  Due to HIGH CALL volume, it would be easier if you could please send faxed requests to expedite your requests and in part, help us provide discharge notifications faster   /////////////////////////////////////////////////////////////////////////////////////////////////////////////////      Initial Clinical Review    Admission: Date/Time/Statement: 9/22/18 @ 94 25 77     Orders Placed This Encounter   Procedures    Inpatient Admission (expected length of stay for this patient is greater than two midnights)     Standing Status:   Standing     Number of Occurrences:   1     Order Specific Question:   Admitting Physician     Answer:   Marcos Greer     Order Specific Question:   Level of Care     Answer:   Med Surg [16]     Order Specific Question:   Estimated length of stay     Answer:   More than 2 Midnights     Order Specific Question:   Certification     Answer:   I certify that inpatient services are medically necessary for this patient for a duration of greater than two midnights  See H&P and MD Progress Notes for additional information about the patient's course of treatment           ED: Date/Time/Mode of Arrival:   ED Arrival Information     Expected Arrival Acuity Means of Arrival Escorted By Service Admission Type    - 9/22/2018 20:48 Urgent Wheelchair Self Surgery-General Urgent    Arrival Complaint    Abdominal pain           Chief Complaint: Chief Complaint   Patient presents with    Abdominal Pain     Pt hunched over in [facility] with high pitched moaning  Pt reports lower abdominal pain for 3 days  Pt has recent surgery to right shoulder, taking Vicodin and motrin for same  History of Illness:    47-year-old male  C/o abd pain     Started about 2 Day ago    No relieving factors       currently severe, associated with some nausea and vomiting      somewhat constipated - attributes to Vicodin that he has been taking for a shoulder surgery done 2 weeks ago  71 4 kg (157 lb 6 5 oz)    09/23/18 0115   97 °F (36 1 °C)  93  18  131/87  95 %  None (Room air)  Lying   09/23/18 0044  --  98  16  140/76  94 %  None (Room air)  Lying   09/22/18 2255  --  97  19  148/83  97 %  None (Room air)  Lying   09/22/18 2106  98 3 °F (36 8 °C)   109  18   173/89  94 %  None (Room air)  Lying       Abnormal Labs/Diagnostic Test Results:   Na  135   136  WBC  14 70    13 10   Hgb  15 0   11 9  hct  45 3   35 8  UA  sg 1 010,  2+ ketones    CTAP -  sigmoid diverticulitis with findings of microperforation   There is resultant inflammation of an adjacent loop of small bowel within the lower pelvis, causing a functional small bowel obstruction and upstream dilatation of small   bowel up to 3 1 cm      ED Treatment:   (IV versed for placement of NG Tube)      Medication Administration from 09/22/2018 2048 to 09/23/2018 0106       Date/Time Order Dose Route Action Action by Comments                09/22/2018 2134 sodium chloride 0 9 % infusion 125 mL/hr Intravenous New Bag Marina Renee RN      09/22/2018 2134 morphine (PF) 10 mg/mL injection 6 mg 6 mg Intravenous Given Marina Renee RN      09/22/2018 2138 ondansetron (ZOFRAN) injection 4 mg 4 mg Intravenous Given Marina Renee RN                 09/23/2018 0011 midazolam (VERSED) injection 3 mg 3 mg Intravenous Given Marina Renee RN           Past Medical/Surgical History:   Asthma, shoulder dislocation    Admitting Diagnosis: Small bowel obstruction (HCC) [K56 609]  Abdominal pain [R10 9]  Moderate persistent asthma without complication [L02 57]  Perforation of sigmoid colon due to diverticulitis [K57 20]    Assessment/Plan    Abdominal pain   Assessment & Plan     Secondary to acute diverticulitis  Continue with IV analgesia       Small bowel obstruction (HCC)   Assessment & Plan     Secondary to acute diverticulitis  NG-tube to lo suction                     * Perforation of sigmoid colon due to diverticulitis   Assessment & Plan     Patient with microperforation of the sigmoid colon due to diverticulitis  Continue to monitor closely for worsening signs and symptoms of sepsis           Admission Orders:  Admit med surg  NPO - NG tube to lo intermittent suction  Sequential compression device to b/l LE  Consult internal medicine  IVF @ 125       9/23/2018  97 1   81    16   129/66    95% RA   IV MS @ 0549  And  0926  IV NS  Changed to LR @ 125 cc/hr  this AM    Flagyl IV q 8  Cipro IV q 12

## 2018-09-23 NOTE — PROGRESS NOTES
Nausea resolved    Abd  + tenderness LLQ      Plan  Obstruction Series  If no obstructioin:  Remove NGT

## 2018-09-23 NOTE — ED PROVIDER NOTES
History  Chief Complaint   Patient presents with    Abdominal Pain     Pt hunched over in [facility] with high pitched moaning  Pt reports lower abdominal pain for 3 days  Pt has recent surgery to right shoulder, taking Vicodin and motrin for same  Patient is a 39-year-old male  He presents to the emergency room complaining of abdominal pain  Started about 2 days ago  It is diffuse  It is currently severe  It is associated with some nausea and vomiting  No hematemesis  No bilious vomiting  Patient has been somewhat constipated which she attributes to Vicodin that he has been taking for a shoulder surgery that he had done 2 weeks ago  No melena or hematochezia  No diarrhea  No urinary complaints  No fever or chills  Again the pain is currently severe  There been no relieving factors  Prior to Admission Medications   Prescriptions Last Dose Informant Patient Reported? Taking? PROAIR  (90 Base) MCG/ACT inhaler   No No   Sig: USE 2 PUFFS BY MOUTH EVERY 6 HOURS FOR WHEEZING/COUGHING   PROAIR  (90 Base) MCG/ACT inhaler   No No   Sig: USE 2 PUFFS BY MOUTH EVERY 6 HOURS FOR WHEEZING/COUGHING   fluticasone-salmeterol (ADVAIR DISKUS) 100-50 mcg/dose inhaler   Yes No   Si puffs qd   fluticasone-salmeterol (ADVAIR DISKUS) 250-50 mcg/dose inhaler   No No   Sig: Inhale 1 puff 2 (two) times a day      Facility-Administered Medications: None       Past Medical History:   Diagnosis Date    Asthma     Shoulder dislocation        No past surgical history on file  Family History   Problem Relation Age of Onset    Asthma Mother     No Known Problems Father      I have reviewed and agree with the history as documented  Social History   Substance Use Topics    Smoking status: Never Smoker    Smokeless tobacco: Never Used    Alcohol use No        Review of Systems   Constitutional: Negative for chills and fever  HENT: Negative for rhinorrhea and sore throat      Eyes: Negative for pain, redness and visual disturbance  Respiratory: Negative for cough and shortness of breath  Cardiovascular: Negative for chest pain and leg swelling  Gastrointestinal: Positive for abdominal pain, constipation, nausea and vomiting  Negative for diarrhea  Endocrine: Negative for polydipsia and polyuria  Genitourinary: Negative for dysuria, frequency and hematuria  Musculoskeletal: Negative for back pain and neck pain  Skin: Negative for rash and wound  Allergic/Immunologic: Negative for immunocompromised state  Neurological: Negative for weakness, numbness and headaches  Psychiatric/Behavioral: Negative for hallucinations and suicidal ideas  All other systems reviewed and are negative  Physical Exam  Physical Exam   Constitutional: He is oriented to person, place, and time  He appears well-developed and well-nourished  He appears distressed  HENT:   Head: Normocephalic and atraumatic  Mouth/Throat: Oropharynx is clear and moist    Eyes: Right eye exhibits no discharge  Left eye exhibits no discharge  No scleral icterus  Neck: Normal range of motion  Neck supple  Cardiovascular: Normal rate, regular rhythm, normal heart sounds and intact distal pulses  Exam reveals no gallop and no friction rub  No murmur heard  Pulmonary/Chest: Effort normal and breath sounds normal  No respiratory distress  He has no wheezes  He has no rales  Abdominal: Soft  Bowel sounds are normal  He exhibits distension  He exhibits no mass  There is tenderness  There is no rebound and no guarding  No hernia  Patient has diffuse moderately severe abdominal tenderness  Musculoskeletal: Normal range of motion  He exhibits no edema, tenderness or deformity  No CVA tenderness  No calf pain  Neurological: He is alert and oriented to person, place, and time  He has normal strength  No sensory deficit  GCS eye subscore is 4  GCS verbal subscore is 5  GCS motor subscore is 6  Skin: Skin is warm and dry  No rash noted  He is not diaphoretic  Psychiatric: He has a normal mood and affect  His behavior is normal    Vitals reviewed        Vital Signs  ED Triage Vitals [09/22/18 2106]   Temperature Pulse Respirations Blood Pressure SpO2   98 3 °F (36 8 °C) (!) 109 18 (!) 173/89 94 %      Temp Source Heart Rate Source Patient Position - Orthostatic VS BP Location FiO2 (%)   Temporal Monitor Lying Left arm --      Pain Score       Worst Possible Pain           Vitals:    09/22/18 2106 09/22/18 2255   BP: (!) 173/89 148/83   Pulse: (!) 109 97   Patient Position - Orthostatic VS: Lying Lying       Visual Acuity      ED Medications  Medications   sodium chloride 0 9 % infusion (125 mL/hr Intravenous New Bag 9/22/18 2134)   metroNIDAZOLE (FLAGYL) IVPB (premix) 500 mg (not administered)   ciprofloxacin (CIPRO) IVPB (premix) 400 mg (not administered)   midazolam (VERSED) injection 3 mg (not administered)   sodium chloride 0 9 % bolus 1,000 mL (not administered)   morphine (PF) 10 mg/mL injection 6 mg (6 mg Intravenous Given 9/22/18 2134)   ondansetron (ZOFRAN) injection 4 mg (4 mg Intravenous Given 9/22/18 2138)   iohexol (OMNIPAQUE) 350 MG/ML injection (SINGLE-DOSE) 100 mL (100 mL Intravenous Given 9/22/18 2240)       Diagnostic Studies  Results Reviewed     Procedure Component Value Units Date/Time    Troponin I [[id number]]  (Normal) Collected:  09/22/18 2133    Lab Status:  Final result Specimen:  Blood from Arm, Left Updated:  09/22/18 2209     Troponin I <0 01 ng/mL     Lipase [[id number]]  (Normal) Collected:  09/22/18 2133    Lab Status:  Final result Specimen:  Blood from Arm, Left Updated:  09/22/18 2158     Lipase 29 u/L     Comprehensive metabolic panel [[id number]]  (Abnormal) Collected:  09/22/18 2133    Lab Status:  Final result Specimen:  Blood from Arm, Left Updated:  09/22/18 2158     Sodium 135 (L) mmol/L      Potassium 4 0 mmol/L      Chloride 96 (L) mmol/L      CO2 25 mmol/L      ANION GAP 14 mmol/L      BUN 20 mg/dL      Creatinine 1 17 mg/dL      Glucose 115 (H) mg/dL      Calcium 9 4 mg/dL      AST 32 U/L      ALT 39 U/L      Alkaline Phosphatase 84 U/L      Total Protein 8 8 (H) g/dL      Albumin 4 8 g/dL      Total Bilirubin 1 00 mg/dL      eGFR 87 ml/min/1 73sq m     Narrative:         National Kidney Disease Education Program recommendations are as follows:  GFR calculation is accurate only with a steady state creatinine  Chronic Kidney disease less than 60 ml/min/1 73 sq  meters  Kidney failure less than 15 ml/min/1 73 sq  meters  CBC and differential [[id number]]  (Abnormal) Collected:  09/22/18 2133    Lab Status:  Final result Specimen:  Blood from Arm, Left Updated:  09/22/18 2149     WBC 14 70 (H) Thousand/uL      RBC 4 92 Million/uL      Hemoglobin 15 0 g/dL      Hematocrit 45 3 %      MCV 92 fL      MCH 30 5 pg      MCHC 33 1 g/dL      RDW 15 1 %      MPV 8 4 (L) fL      Platelets 985 Thousands/uL      Neutrophils Relative 89 (H) %      Lymphocytes Relative 7 (L) %      Monocytes Relative 4 %      Eosinophils Relative 0 %      Basophils Relative 0 %      Neutrophils Absolute 13 10 (H) Thousands/µL      Lymphocytes Absolute 1 00 Thousands/µL      Monocytes Absolute 0 60 Thousand/µL      Eosinophils Absolute 0 00 Thousand/µL      Basophils Absolute 0 00 Thousands/µL     UA w Reflex to Microscopic [[id number]]     Lab Status:  No result Specimen:  Urine                  CT abdomen pelvis with contrast   Final Result by Patricia White MD (09/22 2311)      Findings consistent with sigmoid diverticulitis with findings of microperforation  There is resultant inflammation of an adjacent loop of small bowel within the lower pelvis, causing a functional small bowel obstruction and upstream dilatation of small    bowel up to 3 1 cm        Findings discussed with Dr Tommy Rosas at 11:07 PM, 9/22/2018            Workstation performed: [id number]                    Procedures  ECG 12 Lead Documentation  Date/Time: 9/22/2018 10:40 PM  Performed by: Gaurang Owens  Authorized by: Gaurang Owens     ECG reviewed by me, the ED Provider: yes    Patient location:  ED  Interpretation:     Interpretation: non-specific    Comments:      Normal sinus rhythm  Nonspecific T-wave abnormality  No ectopy  Phone Contacts  ED Phone Contact    ED Course                               MDM  Number of Diagnoses or Management Options  Diagnosis management comments: White blood cell count was elevated  CT scan shows sigmoid diverticulitis with a microperforation  The inflammatory changes a apparently are causing a functional small-bowel obstruction  I consulted with General surgery  They recommended an NG tube  General surgery to see patient in the emergency room for admission  IV antibiotics ordered  Analgesia ordered  Amount and/or Complexity of Data Reviewed  Clinical lab tests: ordered and reviewed  Tests in the radiology section of CPT®: ordered and reviewed  Discuss the patient with other providers: yes  Independent visualization of images, tracings, or specimens: yes      CritCare Time    Disposition  Final diagnoses:   Perforation of sigmoid colon due to diverticulitis   Small bowel obstruction ([address] )     Time reflects when diagnosis was documented in both MDM as applicable and the Disposition within this note     Time User Action Codes Description Comment    9/22/2018 11:36 PM Collins Angelucci Add [K57 92] Diverticulitis     9/22/2018 11:36 PM Collins Angelucci Remove [K57 92] Diverticulitis     9/22/2018 11:37 PM Collins Angelucci Add [K57 20] Perforation of sigmoid colon due to diverticulitis     9/22/2018 11:37 PM Collins Angelucci Add [K56 609] Small bowel obstruction [facility])       ED Disposition     ED Disposition Condition Comment    Admit        Follow-up Information    None         Patient's Medications   Discharge Prescriptions    No medications on file     No discharge procedures on file      ED Provider  Electronically Signed by           Rohit Kennedy MD  09/22/18 0017

## 2018-09-23 NOTE — NURSING NOTE
Patient states NG "feels different " Insertion length noted and verified  Placement checked by auscultation  Small amount of brown colored drainage note  Suction remains on low intermittent suction

## 2018-09-23 NOTE — NURSING NOTE
Patient awake, alert and oriented to PPT  States 3/10 lower abdominal pain, tenderness noted to all quadrants  Denies N/V or diarrhea currently  Denies chest pain  HRR  Lungs CTA, No edema, pulses palpable  NG tube to low intermittent suction intact with small amount yellow, brownish drainage noted  Bed in lowest position, call bell within reach

## 2018-09-23 NOTE — NURSING NOTE
PT arrived on floor @ 1255 From ER AOX3 HR regular Lungs clear hypoactive bowel sounds x4 + PP ABD soft tender to touch  Denies any pain  NG to low intermittent wall suction no drainage noted  Will continue to monitor call bell within reach

## 2018-09-23 NOTE — ED NOTES
This nurse attempted to insert nasogastric tube into left and right nares after giving IV Versed, patient did not tolerate procedure well and NG tube has not gone down, another nurse will attempt to insert tube       Princess Barragan RN  09/23/18 1939

## 2018-09-23 NOTE — CONSULTS
Consult- Adrien Uriostegui 1975, 37 y o  male MRN: [id number]    Unit/Bed#: 7T [facility] 710-02 Encounter: [id number]    Primary Care Provider: Emperatriz Patient, DO   Date and time admitted to hospital: 9/22/2018  9:04 PM      Consults    Abdominal pain   Assessment & Plan    Secondary to acute diverticulitis  Continue with IV analgesia        Small bowel obstruction ([address] )   Assessment & Plan    Secondary to acute diverticulitis  Care as per primary team-surgery  NG-tube attempted  Maintain NPO status        Moderate persistent asthma without complication   Assessment & Plan    Currently stable with no respiratory distress or hypoxia  Continue with nebulizers and oxygen if needed        * Perforation of sigmoid colon due to diverticulitis   Assessment & Plan    Patient with microperforation of the sigmoid colon due to diverticulitis  Admitted under surgery service  Patient with significant abdominal pain and tenderness  Continue to monitor closely for worsening signs and symptoms of sepsis            Code Status:   Full          Total Time for Visit, including counseling / Coordination of Care: 30 minutes  Greater than 50% of this total time spent on direct patient counseling and coordination of care  Chief Complaint:     Abdominal pain    History of Present Illness:    Adrien Uriostegui is a 37 y o  male who presents with 2 days of abdominal pain which is diffuse and severe  Patient reports that the pain was initially mild but has progressively worsened  Associated with nausea and vomiting which is nonbloody and non bilious  Patient reports that he has had shoulder surgery about 2 weeks ago and has been taking Vicodin for pain control and has been constipated with she he initially thought was the cause of his abdominal pain  Patient denies any bright red blood per rectum, melena, diarrhea, fevers, chills  No urinary complaints      Review of Systems:    12 point review of systems is grossly negative unless as stated above in HPI    Past Medical and Surgical History:     Past Medical History:   Diagnosis Date    Asthma     Shoulder dislocation        History reviewed  No pertinent surgical history  Meds/Allergies:    Prior to Admission medications    Medication Sig Start Date End Date Taking? Authorizing Provider   fluticasone-salmeterol (ADVAIR DISKUS) 100-50 mcg/dose inhaler 2 puffs qd 5/26/06  Yes Historical Provider, MD   fluticasone-salmeterol (ADVAIR DISKUS) 250-50 mcg/dose inhaler Inhale 1 puff 2 (two) times a day 3/13/18  Yes Alize Dawn, DO   PROAIR  (40 Base) MCG/ACT inhaler USE 2 PUFFS BY MOUTH EVERY 6 HOURS FOR WHEEZING/COUGHING 8/1/18  Yes Marzette Rutiffany, DO   PROAIR  (74 Base) MCG/ACT inhaler USE 2 PUFFS BY MOUTH EVERY 6 HOURS FOR WHEEZING/COUGHING 8/17/18  Yes Marzette Rued, DO     I have reviewed home medications using allscripts  Allergies: No Known Allergies    Social History:     Marital Status: /Civil Union     Substance Use History:   History   Alcohol Use No     History   Smoking Status    Never Smoker   Smokeless Tobacco    Never Used     History   Drug Use No       Family History:    Family History   Problem Relation Age of Onset    Asthma Mother     No Known Problems Father        Physical Exam:     Vitals:   Blood Pressure: 131/87 (09/23/18 0300)  Pulse: 93 (09/23/18 0300)  Temperature: (!) 97 °F (36 1 °C) (09/23/18 0300)  Temp Source: Temporal (09/23/18 0300)  Respirations: 18 (09/23/18 0300)  Height: 5' 9" (175 3 cm) (09/23/18 0300)  Weight - Scale: 71 4 kg (157 lb 6 5 oz) (09/23/18 0300)  SpO2: 95 % (09/23/18 0300)    General:  Mild discomfort due to abdominal pain  HEENT:  EOMI  Mucous membranes moist   Cardiovascular:  S1-S2  Pulmonary: Clear to auscultation b/l  No rales, rhonchi   Abdomen:   Diffuse abdominal tenderness and hypoactive bowel sounds  Extremities: FROM  No cyanosis, edema  Skin: warm, dry, intact  Neurological: Alert and oriented x 3   No focal deficits  Psychiatric: Appropriate mood and affect      Additional Data:     Lab Results: I have personally reviewed pertinent reports  Results from last 7 days  Lab Units 09/22/18  2133   WBC Thousand/uL 14 70*   HEMOGLOBIN g/dL 15 0   HEMATOCRIT % 45 3   PLATELETS Thousands/uL 319   NEUTROS PCT % 89*   LYMPHS PCT % 7*   MONOS PCT % 4   EOS PCT % 0       Results from last 7 days  Lab Units 09/22/18  2133   SODIUM mmol/L 135*   POTASSIUM mmol/L 4 0   CHLORIDE mmol/L 96*   CO2 mmol/L 25   BUN mg/dL 20   CREATININE mg/dL 1 17   CALCIUM mg/dL 9 4   ALK PHOS U/L 84   ALT U/L 39   AST U/L 32           Imaging: I have personally reviewed pertinent reports  Ct Abdomen Pelvis With Contrast    Result Date: 9/22/2018  Narrative: CT ABDOMEN AND PELVIS WITH IV CONTRAST INDICATION:   abdominal pain  COMPARISON:  None  TECHNIQUE:  CT examination of the abdomen and pelvis was performed  Axial, sagittal, and coronal 2D reformatted images were created from the source data and submitted for interpretation  Radiation dose length product (DLP) for this visit:  451 mGy-cm   This examination, like all CT scans performed in the [facility], was performed utilizing techniques to minimize radiation dose exposure, including the use of iterative reconstruction and automated exposure control  IV Contrast:  100 mL of iohexol (OMNIPAQUE) Enteric Contrast:  Enteric contrast was not administered  FINDINGS: ABDOMEN LOWER CHEST:  No clinically significant abnormality identified in the visualized lower chest  LIVER/BILIARY TREE:  Unremarkable  GALLBLADDER:  No calcified gallstones  No pericholecystic inflammatory change  SPLEEN:  Unremarkable  PANCREAS:  Unremarkable  ADRENAL GLANDS:  Unremarkable  KIDNEYS/URETERS:  Unremarkable  No hydronephrosis   STOMACH AND BOWEL:  There is pericolonic inflammatory change surrounding a segment of sigmoid colon in association with colonic diverticulosis, consistent with diverticulitis  Several foci of air are noted outside the confines of this inflamed segment of sigmoid colon, consistent with microperforation (series 2, image 64)  Adjacent loops of small bowel within the lower pelvis and adjacent to site of diverticular inflammation demonstrate marked wall thickening and mucosal hyperenhancement, consistent with reactive inflammation  This inflammatory process results in a functional small bowel obstruction with upstream dilatation of small bowel up to 3 1 cm  Additional segments of ileum distal to the site of obstruction and adjacent to site of diverticulitis also demonstrate wall thickening and enhancement, consistent with reactive inflammation    APPENDIX:  No findings to suggest appendicitis  ABDOMINOPELVIC CAVITY:  Trace fluid fluid in the dependent pelvis and along the right paracolic gutter, likely reactive  No lymphadenopathy  VESSELS:  Unremarkable for patient's age  PELVIS REPRODUCTIVE ORGANS:  Unremarkable for patient's age  URINARY BLADDER:  Unremarkable  ABDOMINAL WALL/INGUINAL REGIONS:  Unremarkable  OSSEOUS STRUCTURES:  No acute fracture or destructive osseous lesion  Impression: Findings consistent with sigmoid diverticulitis with findings of microperforation  There is resultant inflammation of an adjacent loop of small bowel within the lower pelvis, causing a functional small bowel obstruction and upstream dilatation of small bowel up to 3 1 cm  Findings discussed with Dr Baldev Muller at 11:07 PM, 9/22/2018 Workstation performed: [id number]       [employer] / [employer] Records Reviewed: Yes     ** Please Note: This note has been constructed using a voice recognition system   **

## 2018-09-23 NOTE — PROGRESS NOTES
Patient seen and examined in his room  His abdominal pain is 2 to 3/10 at this time  Strictly NPO continue NG tube suction  Patient is going for an obstruction series shortly  If obstruction series shows resolution small bowel distension then will DC the NG tube  Continue IV Cipro and Flagyl for now  Will order stool cultures and C diff    Explained to the patient about his hospital course

## 2018-09-24 PROCEDURE — 99232 SBSQ HOSP IP/OBS MODERATE 35: CPT | Performed by: INTERNAL MEDICINE

## 2018-09-24 RX ORDER — MORPHINE SULFATE 4 MG/ML
4 INJECTION, SOLUTION INTRAMUSCULAR; INTRAVENOUS
Status: DISCONTINUED | OUTPATIENT
Start: 2018-09-24 | End: 2018-09-28

## 2018-09-24 RX ADMIN — METRONIDAZOLE 500 MG: 500 INJECTION, SOLUTION INTRAVENOUS at 16:56

## 2018-09-24 RX ADMIN — MORPHINE SULFATE 2 MG: 2 INJECTION, SOLUTION INTRAMUSCULAR; INTRAVENOUS at 04:59

## 2018-09-24 RX ADMIN — CIPROFLOXACIN 400 MG: 2 INJECTION, SOLUTION INTRAVENOUS at 07:54

## 2018-09-24 RX ADMIN — SODIUM CHLORIDE, SODIUM LACTATE, POTASSIUM CHLORIDE, AND CALCIUM CHLORIDE 125 ML/HR: 600; 310; 30; 20 INJECTION, SOLUTION INTRAVENOUS at 07:53

## 2018-09-24 RX ADMIN — SODIUM CHLORIDE, SODIUM LACTATE, POTASSIUM CHLORIDE, AND CALCIUM CHLORIDE 125 ML/HR: 600; 310; 30; 20 INJECTION, SOLUTION INTRAVENOUS at 16:29

## 2018-09-24 RX ADMIN — METRONIDAZOLE 500 MG: 500 INJECTION, SOLUTION INTRAVENOUS at 10:08

## 2018-09-24 RX ADMIN — MORPHINE SULFATE 2 MG: 2 INJECTION, SOLUTION INTRAMUSCULAR; INTRAVENOUS at 00:09

## 2018-09-24 RX ADMIN — MORPHINE SULFATE 4 MG: 4 INJECTION INTRAVENOUS at 17:31

## 2018-09-24 RX ADMIN — MORPHINE SULFATE 2 MG: 2 INJECTION, SOLUTION INTRAMUSCULAR; INTRAVENOUS at 10:08

## 2018-09-24 RX ADMIN — METRONIDAZOLE 500 MG: 500 INJECTION, SOLUTION INTRAVENOUS at 00:08

## 2018-09-24 RX ADMIN — MORPHINE SULFATE 4 MG: 4 INJECTION INTRAVENOUS at 21:22

## 2018-09-24 RX ADMIN — CIPROFLOXACIN 400 MG: 2 INJECTION, SOLUTION INTRAVENOUS at 20:34

## 2018-09-24 RX ADMIN — MORPHINE SULFATE 2 MG: 2 INJECTION, SOLUTION INTRAMUSCULAR; INTRAVENOUS at 14:16

## 2018-09-24 NOTE — MEDICAL STUDENT
Assessment/Plan:  Perforation of sigmoid colon due to diverticulitis  · Admitted under surgery service   · WBC mildly elevated at 13 10, Lactic acid WNL  · Pt states abdominal pain is less but still severe and morphine only helps it for 1 hour before it returns  Pain is 5-6/10 at rest, and 10/10 with movement  No peritoneal signs  · Plan:  · Discontinue NGT today (see below)  · IV Cipro & Flagyl and continue to monitor  · Maybe consult GI If pain continues to be severe  · Monitor for signs of sepsis  · Pending stool cultures & C  diff  Small bowel obstruction  · Secondary to acute diverticulitis  · Obstruction series still showed mild distention consistent with an ileus  · Pt had a large formed BM overnight  · Pt states the NGT is causing him headaches, nose pain  · Plan:  · Care as per primary team- surgery  · NGT removal today  · IVF  · Start to slowly advance diet  Ketonuria  · Most likely secondary to anorexia and NPO status  Moderate persistent asthma without complication  · Currently stable w/ no respiratory distress  · Plan: PRN nebulizer, oxygen if needed      Subjective:   Pt today says his abdominal pain has improved today, however it is still severe and worsened by movement  His main concern is the abdominal pain and the NGT which he wants to remove, as it is irritating his nose and throat, as well as giving him a headache and chest pain  He is able to ambulate to the bathroom although it does exacerbate his abdominal pain  He is currently NPO and had a formed BM yesterday, and last night had another BM that was watery  Denies blood in the stools  Denies swelling in his legs, SOB, chest tightness, fever/chills, coughing/choking, dysphagia  Objective:     Vitals:   Temp (24hrs), Av 4 °F (36 3 °C), Min:97 °F (36 1 °C), Max:97 8 °F (36 6 °C)    HR:  [78-91] 91  Resp:  [17-18] 17  BP: (113-124)/(70-74) 124/74  SpO2:  [96 %-97 %] 96 %  Body mass index is 23 25 kg/m²       Input and Output Summary (last 24 hours): Intake/Output Summary (Last 24 hours) at 09/24/18 1222  Last data filed at 09/24/18 0959   Gross per 24 hour   Intake             2500 ml   Output             1200 ml   Net             1300 ml       Physical Exam:     Physical Exam   Constitutional: He is oriented to person, place, and time  He appears well-developed and well-nourished  He appears distressed  HENT:   Head: Normocephalic and atraumatic  Mouth/Throat: Oropharynx is clear and moist    NGT in place draining dark brown / yellow fluid   Eyes: EOM are normal  Pupils are equal, round, and reactive to light  Neck:   Unable to move neck due to pain from NGT   Cardiovascular: Normal rate, regular rhythm, normal heart sounds and intact distal pulses  Pulmonary/Chest: Breath sounds normal    Unable to take deep breath due to pain in lower abdomen   Abdominal: He exhibits distension  Bowel sounds are decreased  There is tenderness in the right lower quadrant and left lower quadrant  There is guarding  Entire abdomen rigid  Voluntary guarding noted in LLQ and RLQ   Musculoskeletal: He exhibits no edema or tenderness  Neurological: He is alert and oriented to person, place, and time  Skin: Skin is warm and dry  No rash noted           Additional Data:     Labs:      Results from last 7 days  Lab Units 09/23/18  0837 09/22/18  2133   WBC Thousand/uL 13 10* 14 70*   HEMOGLOBIN g/dL 11 9* 15 0   HEMATOCRIT % 35 8* 45 3   PLATELETS Thousands/uL 247 319   NEUTROS PCT %  --  89*   LYMPHS PCT %  --  7*   MONOS PCT %  --  4   EOS PCT %  --  0       Results from last 7 days  Lab Units 09/23/18  0837 09/22/18  2133   SODIUM mmol/L 136* 135*   POTASSIUM mmol/L 3 9 4 0   CHLORIDE mmol/L 103 96*   CO2 mmol/L 23 25   BUN mg/dL 16 20   CREATININE mg/dL 0 90 1 17   CALCIUM mg/dL 8 2* 9 4   ALK PHOS U/L  --  84   ALT U/L  --  39   AST U/L  --  32       Results from last 7 days  Lab Units 09/23/18  0837   INR  1 23* Imaging:        Recent Cultures (last 7 days):           Last 24 Hours Medication List:     Current Facility-Administered Medications:  ciprofloxacin 400 mg Intravenous Q12H Pricilla Pires MD Last Rate: 400 mg (09/24/18 0754)   lactated ringers 125 mL/hr Intravenous Continuous Sravan Gonzalez DO Last Rate: 125 mL/hr (09/24/18 0753)   metroNIDAZOLE 500 mg Intravenous Q8H Pricilla Pires MD Last Rate: 500 mg (09/24/18 1008)   morphine injection 2 mg Intravenous Q4H PRN Ivania Porter MD    ondansetron 4 mg Intravenous Once Sravan Gonzalez DO         Today, Patient Was Seen By: Diandra Cline    ** Please Note: Dictation voice to text software may have been used in the creation of this document   **

## 2018-09-24 NOTE — NURSING NOTE
Patient discharged  IV removed, belongings account for  Discharge instructions explained to patient, questions answered and understanding verbalized  Patient aware of need to  prescriptions at the pharmacy  Patient escorted out with volunteer via wheelchair

## 2018-09-24 NOTE — PROGRESS NOTES
Less abdominal  Pain    +LLQ  Tenderness  No peritoneal signs  NGT=  100cc/ 8 hrs  Obstruction series:  +Mild ileus  Plan:  D/C  NGT

## 2018-09-24 NOTE — NURSING NOTE
Assessment unchanged  Denies any pain PT resting comfortable no distress noted  Will continue to monitor call bell within reach

## 2018-09-24 NOTE — PHYSICIAN ADVISOR
Current patient class: Inpatient  The patient is currently on Hospital Day: 2 at [address]      The patient was admitted to the hospital at [phone] on 9/22/18 for the following diagnosis:  Small bowel obstruction ([address] ) [K56 609]  Abdominal pain [R10 9]  Moderate persistent asthma without complication [E94 39]  Perforation of sigmoid colon due to diverticulitis [K57 20]       There is documentation in the medical record of an expected length of stay of at least 2 midnights  The patient is therefore expected to satisfy the 2 midnight benchmark and given the 2 midnight presumption is appropriate for INPATIENT ADMISSION  Given this expectation of a satisfying stay, CMS instructs us that the patient is most often appropriate for inpatient admission under part A provided medical necessity is documented in the chart  After review of the relevant documentation, labs, vital signs and test results, the patient is appropriate for INPATIENT ADMISSION  Admission to the hospital as an inpatient is a complex decision making process which requires the practitioner to consider the patients presenting complaint, history and physical examination and all relevant testing  With this in mind, in this case, the patient was deemed appropriate for INPATIENT ADMISSION  After review of the documentation and testing available at the time of the admission I concur with this clinical determination of medical necessity  Rationale is as follows: The patient is a 37 yrs old Male who presented to the ED at 9/22/2018  9:04 PM with a chief complaint of Abdominal Pain (Pt hunched over in [facility] with high pitched moaning  Pt reports lower abdominal pain for 3 days   Pt has recent surgery to right shoulder, taking Vicodin and motrin for same )     Given the need for further hospitalization, and along with the documentation of medical necessity present in the chart, the patient is appropriate for inpatient admission  The patient is expected to satisfy the 2 midnight benchmark, and will require further acute medical care  The patient does have comorbid conditions which increases the risk for significant adverse outcome  Given this the patient is appropriate for inpatient admission  The patients vitals on arrival were ED Triage Vitals [09/22/18 2106]   Temperature Pulse Respirations Blood Pressure SpO2   98 3 °F (36 8 °C) (!) 109 18 (!) 173/89 94 %      Temp Source Heart Rate Source Patient Position - Orthostatic VS BP Location FiO2 (%)   Temporal Monitor Lying Left arm --      Pain Score       Worst Possible Pain           Past Medical History:   Diagnosis Date    Asthma     Shoulder dislocation      History reviewed  No pertinent surgical history  Consults have been placed to:   IP CONSULT TO INTERNAL MEDICINE    Vitals:    09/23/18 0115 09/23/18 0300 09/23/18 0800 09/23/18 1600   BP: 131/87 131/87 129/66 113/70   BP Location: Left arm Left arm Left arm Left arm   Pulse: 93 93 81 78   Resp: 18 18 16 18   Temp: (!) 97 °F (36 1 °C) (!) 97 °F (36 1 °C) (!) 97 1 °F (36 2 °C) 97 8 °F (36 6 °C)   TempSrc: Temporal Temporal Temporal Temporal   SpO2: 95% 95% 95% 97%   Weight:  71 4 kg (157 lb 6 5 oz)     Height:  5' 9" (1 753 m)         Most recent labs:    Recent Labs      09/22/18   2133  09/23/18   0837   WBC  14 70*  13 10*   HGB  15 0  11 9*   HCT  45 3  35 8*   PLT  319  247   K  4 0  3 9   NA  135*  136*   CALCIUM  9 4  8 2*   BUN  20  16   CREATININE  1 17  0 90   LIPASE  29   --    INR   --   1 23*   TROPONINI  <0 01   --    AST  32   --    ALT  39   --    ALKPHOS  84   --        Scheduled Meds:  Current Facility-Administered Medications:  ciprofloxacin 400 mg Intravenous Q12H Pricilla Pires MD Last Rate: 400 mg (09/23/18 2017)   lactated ringers 125 mL/hr Intravenous Continuous Heber Balderas DO Last Rate: 125 mL/hr (09/23/18 2017)   metroNIDAZOLE 500 mg Intravenous Q8H Pricilla Radford MD Last Rate: Stopped (09/23/18 1700)   morphine injection 2 mg Intravenous Q4H PRN Vibha Henry MD    ondansetron 4 mg Intravenous Once Aletha Song,       Continuous Infusions:  lactated ringers 125 mL/hr Last Rate: 125 mL/hr (09/23/18 2017)     PRN Meds: morphine injection    Surgical procedures (if appropriate):

## 2018-09-24 NOTE — SOCIAL WORK
Pt admitted with sigmoid diverticulitis with  Microperforation- NGT placed on LIS which is to be d/c'd this day with pt starting on ice chips  Pt is unemployed with wife starting a new job recently and without insurance during probation timeframe- pt states wife had completed an application previously for MA for the family- GAURANG Dow, Financial Counselor advised of same  Pt's PCP is Radha Khan with him using [facility] for prescriptionsl  Pt recently had surgery on (R) Shoulder for RTC with arm in [facility]- does not use an AD- has an appointment with Dr Bronson Tam, [address] [place] [address], next Friday  Wife will transport pt home when medically cleared  No further d/c needs identified at this time

## 2018-09-24 NOTE — PROGRESS NOTES
VTE Pharmacologic Prophylaxis:   Pharmacologic:   None per surgeon needs surgery  Mechanical VTE Prophylaxis in Place: Yes    Patient Centered Rounds: I have performed bedside rounds with nursing staff today  Discussions with Specialists or Other Care Team Provider:   No    Education and Discussions with Family / Patient:   Patient    Time Spent for Care: 30 minutes  More than 50% of total time spent on counseling and coordination of care as described above  Current Length of Stay: 2 day(s)    Current Patient Status: Inpatient   Certification Statement: The patient will continue to require additional inpatient hospital stay due to Surgical management    Discharge Plan:   Per surgeon    Code Status: Level 1 - Full Code      Subjective:   Complaining of abdominal pain requesting pain medication to be given more frequent did move his bowels nausea no more vomiting only ice chips NG out    Objective:     Vitals:   Temp (24hrs), Av 9 °F (36 6 °C), Min:97 °F (36 1 °C), Max:98 7 °F (37 1 °C)    HR:  [78-91] 78  Resp:  [17] 17  BP: (124-146)/(74-82) 146/82  SpO2:  [96 %] 96 %  Body mass index is 23 25 kg/m²  Input and Output Summary (last 24 hours): Intake/Output Summary (Last 24 hours) at 18 1659  Last data filed at 18 1624   Gross per 24 hour   Intake             2800 ml   Output             1685 ml   Net             1115 ml       Physical Exam:     Physical Exam   Constitutional: He is oriented to person, place, and time  He appears well-developed and well-nourished  HENT:   Head: Normocephalic and atraumatic  Right Ear: External ear normal    Left Ear: External ear normal    Mouth/Throat: Oropharynx is clear and moist    Eyes: Conjunctivae and EOM are normal  Pupils are equal, round, and reactive to light  Neck: Normal range of motion  Neck supple  Cardiovascular: Normal rate, regular rhythm, normal heart sounds and intact distal pulses      Pulmonary/Chest: Effort normal and breath sounds normal    Abdominal: He exhibits distension  He exhibits no mass  There is tenderness  There is guarding  There is no rebound  Bowel sound diminished but positive   Genitourinary:   Genitourinary Comments: deferred   Musculoskeletal: Normal range of motion  Neurological: He is alert and oriented to person, place, and time  He has normal reflexes  Skin: Skin is warm and dry  No rash noted  Psychiatric: He has a normal mood and affect  Nursing note and vitals reviewed  Additional Data:     Labs:      Results from last 7 days  Lab Units 09/23/18  0837 09/22/18  2133   WBC Thousand/uL 13 10* 14 70*   HEMOGLOBIN g/dL 11 9* 15 0   HEMATOCRIT % 35 8* 45 3   PLATELETS Thousands/uL 247 319   NEUTROS PCT %  --  89*   LYMPHS PCT %  --  7*   MONOS PCT %  --  4   EOS PCT %  --  0       Results from last 7 days  Lab Units 09/23/18  0837 09/22/18  2133   SODIUM mmol/L 136* 135*   POTASSIUM mmol/L 3 9 4 0   CHLORIDE mmol/L 103 96*   CO2 mmol/L 23 25   BUN mg/dL 16 20   CREATININE mg/dL 0 90 1 17   CALCIUM mg/dL 8 2* 9 4   ALK PHOS U/L  --  84   ALT U/L  --  39   AST U/L  --  32       Results from last 7 days  Lab Units 09/23/18  0837   INR  1 23*                 * I Have Reviewed All Lab Data Listed Above  * Additional Pertinent Lab Tests Reviewed: [address] Admission Reviewed    Imaging:    Imaging Reports Reviewed Today Include:   Obstructive series  Imaging Personally Reviewed by Myself Includes:  Reviewed    Recent Cultures (last 7 days):           Last 24 Hours Medication List:     Current Facility-Administered Medications:  ciprofloxacin 400 mg Intravenous Q12H Pricilla Pires MD Last Rate: Stopped (09/24/18 0900)   lactated ringers 125 mL/hr Intravenous Continuous Harrold Buerger, DO Last Rate: 125 mL/hr (09/24/18 1629)   metroNIDAZOLE 500 mg Intravenous Q8H Pricilla Pires MD Last Rate: 500 mg (09/24/18 1656)   morphine injection 2 mg Intravenous Q4H PRN Juli Jo MD ondansetron 4 mg Intravenous Once Avelina Chadwick DO         Today, Patient Was Seen By: Marquis Martines MD    ** Please Note: Dictation voice to text software may have been used in the creation of this document   **          Progress Note - Troy Iraheta 1975, 37 y o  male MRN: [id number]    Unit/Bed#: 7T [facility] 710-02 Encounter: [id number]    Primary Care Provider: Sara Gallegos DO   Date and time admitted to hospital: 9/22/2018  9:04 PM        Abdominal pain   Assessment & Plan    Secondary to acute diverticulitis  Continue with IV analgesia  Pain management per surgeon        Small bowel obstruction [facility])   Assessment & Plan    Secondary to acute diverticulitis  Care as per primary team-surgery  NG-tube attempted  Maintain NPO status  Obstructive series noted most probably ileus secondary to perforated microperforation diverticulitis with adjacent small-bowel inflamed        Moderate persistent asthma without complication   Assessment & Plan    Currently stable with no respiratory distress or hypoxia  Continue with nebulizers and oxygen if needed  Lungs clear continue to watch        * Perforation of sigmoid colon due to diverticulitis   Assessment & Plan    Patient with microperforation of the sigmoid colon due to diverticulitis  Admitted under surgery service  Patient with significant abdominal pain and tenderness  Continue to monitor closely for worsening signs and symptoms of sepsis  Acute diverticulitis few micro perforation with adjacent is more bowel inflamed  Management per surgeon  Continue antibiotic  DC NG tube I  Did move his bowels

## 2018-09-24 NOTE — NURSING NOTE
AOX3 HR regular Lungs clear hypoactive Bowel Sounds x4 + PP ABD soft tender to touch  C/O pain Morphine was given and was effective  Will continue o monitor call bell within reach

## 2018-09-24 NOTE — NURSING NOTE
Patient awake, alert and oriented to PPT, states 7/10 abdominal pain, states, Morphine is working but relief is short-lived " Education on pain medication  Denies shortness of breath  HRR  Lungs CTA bilaterally  Denies N/V or diarrhea, reports large, formed BM overnight  No edema, pulses palpable  NG tube to left nare draining small amount of brownish drainage  SCDs on, bed in lowest position, call bell within reach

## 2018-09-25 LAB
ANION GAP SERPL CALCULATED.3IONS-SCNC: 10 MMOL/L (ref 5–14)
BASOPHILS # BLD AUTO: 0 THOUSANDS/ΜL (ref 0–0.1)
BASOPHILS NFR BLD AUTO: 0 % (ref 0–1)
BUN SERPL-MCNC: 10 MG/DL (ref 5–25)
CALCIUM SERPL-MCNC: 8.2 MG/DL (ref 8.4–10.2)
CHLORIDE SERPL-SCNC: 99 MMOL/L (ref 97–108)
CO2 SERPL-SCNC: 27 MMOL/L (ref 22–30)
CREAT SERPL-MCNC: 0.86 MG/DL (ref 0.7–1.5)
EOSINOPHIL # BLD AUTO: 0.2 THOUSAND/ΜL (ref 0–0.4)
EOSINOPHIL NFR BLD AUTO: 2 % (ref 0–6)
ERYTHROCYTE [DISTWIDTH] IN BLOOD BY AUTOMATED COUNT: 15.1 %
GFR SERPL CREATININE-BSD FRML MDRD: 121 ML/MIN/1.73SQ M
GLUCOSE SERPL-MCNC: 83 MG/DL (ref 70–99)
HCT VFR BLD AUTO: 35.5 % (ref 41–53)
HGB BLD-MCNC: 12 G/DL (ref 13.5–17.5)
LYMPHOCYTES # BLD AUTO: 1.5 THOUSANDS/ΜL (ref 0.5–4)
LYMPHOCYTES NFR BLD AUTO: 20 % (ref 20–50)
MAGNESIUM SERPL-MCNC: 1.7 MG/DL (ref 1.6–2.3)
MCH RBC QN AUTO: 30.7 PG (ref 26–34)
MCHC RBC AUTO-ENTMCNC: 33.7 G/DL (ref 31–36)
MCV RBC AUTO: 91 FL (ref 80–100)
MONOCYTES # BLD AUTO: 0.7 THOUSAND/ΜL (ref 0.2–0.9)
MONOCYTES NFR BLD AUTO: 9 % (ref 1–10)
NEUTROPHILS # BLD AUTO: 5.3 THOUSANDS/ΜL (ref 1.8–7.8)
NEUTS SEG NFR BLD AUTO: 69 % (ref 45–65)
PLATELET # BLD AUTO: 245 THOUSANDS/UL (ref 150–450)
PMV BLD AUTO: 8.3 FL (ref 8.9–12.7)
POTASSIUM SERPL-SCNC: 3.4 MMOL/L (ref 3.6–5)
RBC # BLD AUTO: 3.9 MILLION/UL (ref 4.5–5.9)
SODIUM SERPL-SCNC: 136 MMOL/L (ref 137–147)
WBC # BLD AUTO: 7.7 THOUSAND/UL (ref 4.5–11)

## 2018-09-25 PROCEDURE — 99232 SBSQ HOSP IP/OBS MODERATE 35: CPT | Performed by: INTERNAL MEDICINE

## 2018-09-25 PROCEDURE — 87493 C DIFF AMPLIFIED PROBE: CPT | Performed by: FAMILY MEDICINE

## 2018-09-25 PROCEDURE — 83735 ASSAY OF MAGNESIUM: CPT | Performed by: INTERNAL MEDICINE

## 2018-09-25 PROCEDURE — 85025 COMPLETE CBC W/AUTO DIFF WBC: CPT | Performed by: INTERNAL MEDICINE

## 2018-09-25 PROCEDURE — 80048 BASIC METABOLIC PNL TOTAL CA: CPT | Performed by: INTERNAL MEDICINE

## 2018-09-25 RX ORDER — POTASSIUM CHLORIDE 14.9 MG/ML
20 INJECTION INTRAVENOUS
Status: DISPENSED | OUTPATIENT
Start: 2018-09-25 | End: 2018-09-25

## 2018-09-25 RX ORDER — POTASSIUM CHLORIDE 29.8 MG/ML
40 INJECTION INTRAVENOUS ONCE
Status: DISCONTINUED | OUTPATIENT
Start: 2018-09-25 | End: 2018-09-25

## 2018-09-25 RX ORDER — ALBUTEROL SULFATE 90 UG/1
2 AEROSOL, METERED RESPIRATORY (INHALATION) EVERY 4 HOURS PRN
Status: DISCONTINUED | OUTPATIENT
Start: 2018-09-25 | End: 2018-10-01 | Stop reason: HOSPADM

## 2018-09-25 RX ORDER — ALBUTEROL SULFATE 90 UG/1
2 AEROSOL, METERED RESPIRATORY (INHALATION) EVERY 4 HOURS PRN
Status: DISCONTINUED | OUTPATIENT
Start: 2018-09-25 | End: 2018-09-25

## 2018-09-25 RX ADMIN — MORPHINE SULFATE 4 MG: 4 INJECTION INTRAVENOUS at 11:22

## 2018-09-25 RX ADMIN — SODIUM CHLORIDE, SODIUM LACTATE, POTASSIUM CHLORIDE, AND CALCIUM CHLORIDE 125 ML/HR: 600; 310; 30; 20 INJECTION, SOLUTION INTRAVENOUS at 02:47

## 2018-09-25 RX ADMIN — METRONIDAZOLE 500 MG: 500 INJECTION, SOLUTION INTRAVENOUS at 00:06

## 2018-09-25 RX ADMIN — METRONIDAZOLE 500 MG: 500 INJECTION, SOLUTION INTRAVENOUS at 09:08

## 2018-09-25 RX ADMIN — CIPROFLOXACIN 400 MG: 2 INJECTION, SOLUTION INTRAVENOUS at 08:05

## 2018-09-25 RX ADMIN — SODIUM CHLORIDE, SODIUM LACTATE, POTASSIUM CHLORIDE, AND CALCIUM CHLORIDE 125 ML/HR: 600; 310; 30; 20 INJECTION, SOLUTION INTRAVENOUS at 11:23

## 2018-09-25 RX ADMIN — MORPHINE SULFATE 4 MG: 4 INJECTION INTRAVENOUS at 16:58

## 2018-09-25 RX ADMIN — MORPHINE SULFATE 4 MG: 4 INJECTION INTRAVENOUS at 22:07

## 2018-09-25 RX ADMIN — MORPHINE SULFATE 4 MG: 4 INJECTION INTRAVENOUS at 00:27

## 2018-09-25 RX ADMIN — SODIUM CHLORIDE, SODIUM LACTATE, POTASSIUM CHLORIDE, AND CALCIUM CHLORIDE 125 ML/HR: 600; 310; 30; 20 INJECTION, SOLUTION INTRAVENOUS at 20:46

## 2018-09-25 RX ADMIN — METRONIDAZOLE 500 MG: 500 INJECTION, SOLUTION INTRAVENOUS at 16:48

## 2018-09-25 RX ADMIN — MORPHINE SULFATE 4 MG: 4 INJECTION INTRAVENOUS at 07:18

## 2018-09-25 RX ADMIN — CIPROFLOXACIN 400 MG: 2 INJECTION, SOLUTION INTRAVENOUS at 20:47

## 2018-09-25 NOTE — NURSING NOTE
Assessment unchanged  C/O pain Morphine was given and was effective  PT resting comfortable no distress noted  Will continue to monitor call bell within reach

## 2018-09-25 NOTE — NURSING NOTE
AOX3 HR regular Lungs clear hypoactive Bowel Sounds x4 + PP ABD soft  C/O pain Morphine was given and was effective  Will continue to monitor  Call bell within reach

## 2018-09-25 NOTE — NURSING NOTE
On initial rounds patient in no apparent distress  Skin warm and dry to touch  Pleasant and cooperative  Was medicated for pain as ordered  Remain on IV Cipro and Flagyl with LR @ 125 cc per hour infusing well  Stool was loose and green same sent to lab as ordered  Voids adequate amount of urine, Remain NPO and understanding verbalized  Abdomen soft, tender with hypoactive bowel sounds  All safety maintained  Will continue to monitor

## 2018-09-25 NOTE — DEATH NOTE
IMPORTANT NOTE: This is a PROGRESS note, NOT a "death note " [facility] has been notified of the problem, and they are working to resolve it  The type of note will be changed as soon as they do  Thank you for understanding  Subjective: Ruperto Iniguez is a 37 y o  male who was admitted for diverticulitis with a microperforation, and is currently hospital day 2, and has 3/10 pain  His pain is intermittent and has improved since yesterday; however when he moves the pain increases to a 4/10  The pain is located in his LLQ and RLQ, and is "sore" in nature  Yesterday he had a "crampy" pain that was a 5-7/10 pain that came and went  The patient states that the stomach pain has improved since the NG tube was removed yesterday, and that it worsens with movement such as sitting up or getting up to use the urinal  The patient denies having N/V  patient had a stool this morning that was black and "liquidy " He had two stools yesterday that were not black, and the first one was more formed, while the second one was looser  The patient denies belching or passing gas today  Today he has not had any ice-chips; however, yesterday he had a full 8 oz  cup of ice-chips  He is eager to have more ice-chips today  The patient denies CP, palpitations, SOB, or coughing up any blood  Objective:   Last vital set at 7:30 am:   T: 98 8 F, Pulse: 91, Resp: 18, BP: 144/83, O2: 95%    Hgb: 12 g/dL, HCT: 35 5  Heart: RRR, no murmur present  Pulm: CTA bilaterally, no rales, or crackles present  Abd: BS are present  Abdomen is distended and soft to palpation in the upper quadrants, but there is guarding in the lower quadrants  Pt is tender to light palpation in RLQ and LLQ, and tender to deep palpation in all quadrants  He grimaced with the palpation of the lower two quadrants  + guarding, - rebound    Vascular: No swelling of the calves, - Chay's sign    Urine in his urinal is at the 800 mL alexandre and is yellow, without redness or cloudiness  Assessment:   1) Mild ileus post diverticulitis with microperforation  2) Pain    Plan:   Continue to monitor pain, N/V  Administer Morphine per protocol  Continue giving ice-chips       Jeremy Agent, SARAH

## 2018-09-25 NOTE — PROGRESS NOTES
VTE Pharmacologic Prophylaxis:   Pharmacologic: Pharmacologic VTE Prophylaxis contraindicated due to Surgical intervention may needed  Mechanical VTE Prophylaxis in Place: Yes    Patient Centered Rounds: I have performed bedside rounds with nursing staff today  Discussions with Specialists or Other Care Team Provider:   No    Education and Discussions with Family / Patient:   Patient    Time Spent for Care: 30 minutes  More than 50% of total time spent on counseling and coordination of care as described above  Current Length of Stay: 3 day(s)    Current Patient Status: Inpatient   Certification Statement: The patient will continue to require additional inpatient hospital stay due to For surgical treatment    Discharge Plan:   Next in 1-2 days    Code Status: Level 1 - Full Code      Subjective:   Abdominal pain is better but is still having having pain requiring around the clock med pain medication did move his bowel still on ice chips    Objective:     Vitals:   Temp (24hrs), Av 9 °F (36 6 °C), Min:97 3 °F (36 3 °C), Max:98 8 °F (37 1 °C)    HR:  [83-91] 84  Resp:  [18-20] 18  BP: (127-144)/(79-83) 127/79  SpO2:  [95 %] 95 %  Body mass index is 23 25 kg/m²  Input and Output Summary (last 24 hours): Intake/Output Summary (Last 24 hours) at 18 1616  Last data filed at 18 1219   Gross per 24 hour   Intake             1500 ml   Output             1913 ml   Net             -413 ml       Physical Exam:     Physical Exam   Constitutional: He is oriented to person, place, and time  He appears well-developed and well-nourished  HENT:   Head: Normocephalic and atraumatic  Right Ear: External ear normal    Left Ear: External ear normal    Mouth/Throat: Oropharynx is clear and moist    Eyes: Conjunctivae and EOM are normal  Pupils are equal, round, and reactive to light  Neck: Normal range of motion  Neck supple     Cardiovascular: Normal rate, regular rhythm, normal heart sounds and intact distal pulses  Pulmonary/Chest: Effort normal and breath sounds normal    Abdominal: Soft  He exhibits distension  He exhibits no mass  There is tenderness  There is guarding  There is no rebound  Bowel sounds present but diminished   Genitourinary:   Genitourinary Comments: deferred   Musculoskeletal: Normal range of motion  Neurological: He is alert and oriented to person, place, and time  He has normal reflexes  Skin: Skin is warm and dry  No rash noted  Psychiatric: He has a normal mood and affect  Nursing note and vitals reviewed  Additional Data:     Labs:      Results from last 7 days  Lab Units 09/25/18  0505   WBC Thousand/uL 7 70   HEMOGLOBIN g/dL 12 0*   HEMATOCRIT % 35 5*   PLATELETS Thousands/uL 245   NEUTROS PCT % 69*   LYMPHS PCT % 20   MONOS PCT % 9   EOS PCT % 2       Results from last 7 days  Lab Units 09/25/18  0505  09/22/18  2133   SODIUM mmol/L 136*  < > 135*   POTASSIUM mmol/L 3 4*  < > 4 0   CHLORIDE mmol/L 99  < > 96*   CO2 mmol/L 27  < > 25   BUN mg/dL 10  < > 20   CREATININE mg/dL 0 86  < > 1 17   CALCIUM mg/dL 8 2*  < > 9 4   ALK PHOS U/L  --   --  84   ALT U/L  --   --  39   AST U/L  --   --  32   < > = values in this interval not displayed  Results from last 7 days  Lab Units 09/23/18  0837   INR  1 23*                 * I Have Reviewed All Lab Data Listed Above  * Additional Pertinent Lab Tests Reviewed: [address] Admission Reviewed  None today  Imaging:    Imaging Reports Reviewed Today Include:   None today  Imaging Personally Reviewed by Myself Includes:  Reviewed    Recent Cultures (last 7 days):           Last 24 Hours Medication List:     Current Facility-Administered Medications:  ciprofloxacin 400 mg Intravenous Q12H Pricilla Pires MD Last Rate: Stopped (09/25/18 0905)   lactated ringers 125 mL/hr Intravenous Continuous Luwana DO Manoj Last Rate: 125 mL/hr (09/25/18 1123)   metroNIDAZOLE 500 mg Intravenous Q8H Pricilla REAVES  MD Lexis Last Rate: Stopped (09/25/18 9591)   morphine injection 4 mg Intravenous Q3H PRN Hershell Backer, DO    ondansetron 4 mg Intravenous Once Hershell Backer, DO    potassium chloride 40 mEq Intravenous Once Christianne Garcia MD         Today, Patient Was Seen By: Christianne Garcia MD    ** Please Note: Dictation voice to text software may have been used in the creation of this document   **          Progress Note - Leonora Malik 1975, 37 y o  male MRN: [id number]    Unit/Bed#: 7[facility] 710-02 Encounter: [id number]    Primary Care Provider: Suze Cuadra DO   Date and time admitted to hospital: 9/22/2018  9:04 PM        Abdominal pain   Assessment & Plan    Secondary to acute diverticulitis  Continue with IV analgesia  Pain management per surgeon        Small bowel obstruction [facility])   Assessment & Plan    Secondary to acute diverticulitis  Care as per primary team-surgery  NG-tube attempted  Maintain NPO status  Obstructive series noted most probably ileus secondary to perforated microperforation diverticulitis with adjacent small-bowel inflamed        Moderate persistent asthma without complication   Assessment & Plan    Currently stable with no respiratory distress or hypoxia  Continue with nebulizers and oxygen if needed  Lungs clear continue to watch        * Perforation of sigmoid colon due to diverticulitis   Assessment & Plan    Patient with microperforation of the sigmoid colon due to diverticulitis  Admitted under surgery service  Patient with significant abdominal pain and tenderness  Continue to monitor closely for worsening signs and symptoms of sepsis  Acute diverticulitis few micro perforation with adjacent is more bowel inflamed  Management per surgeon  Continue antibiotic pain is improving  Move the bowel  Still on ice chips

## 2018-09-25 NOTE — MEDICAL STUDENT
Assessment/Plan:  Perforation of sigmoid colon due to diverticulitis  · Admitted under surgery service   · WBC mildly elevated at 13 10, Lactic acid WNL  · Pt states abdominal pain is less but still severe and morphine only helps it for 1 hour before it returns  Pain is 5-6/10 at rest, and 10/10 with movement  No peritoneal signs  · Pt is unable to take deep breath due to pain  Pulse ox 95% on RA  · Plan:  ? Incentive spirometry  ? IV Cipro & Flagyl and continue to monitor  § Maybe consult GI If pain continues to be severe  ? Monitor for signs of sepsis  ? Pending stool cultures & C  diff  Hypokalemia  · 3 4 this morning  · Plan: replace 20mEq oral KCl  Small bowel obstruction  · Secondary to acute diverticulitis  · Obstruction series still showed mild distention consistent with an ileus  · Pt had a large formed BM on  night followed by watery diarrhea  · Last night had another episode of watery diarrhea which was black unlike his previous 2 bowel movements  · Plan:  ? Care as per primary team- surgery  ? Continue IVF  ? Start to slowly advance diet  Ketonuria  · Most likely secondary to anorexia and NPO status  Moderate persistent asthma without complication  · Currently stable w/ no respiratory distress  · Plan: PRN nebulizer, oxygen if needed  · Incentive spirometry    Subjective:   Patient's abdominal pain is slowly improving today  He describes it at a 3/10 ache which is made worse with deep breaths and movement  He had another watery BM last night which was black  Has only had one cup of ice chips since removal of his NGT yesterday  When ambulating he is steady but feels a bit weak and dizzy  Only ambulates to and from the bathroom but otherwise has stayed in bed  Bedside commode with clear yellow urine  He denies N/V/C, CP, SOB, leg pain, swelling, cough      Objective:     Vitals:   Temp (24hrs), Av 3 °F (36 8 °C), Min:97 3 °F (36 3 °C), Max:98 8 °F (37 1 °C)    HR:  [78-91] 91  Resp:  [17-20] 18  BP: (133-146)/(81-83) 144/83  SpO2:  [95 %-96 %] 95 %  Body mass index is 23 25 kg/m²  Input and Output Summary (last 24 hours): Intake/Output Summary (Last 24 hours) at 09/25/18 0940  Last data filed at 09/25/18 9274   Gross per 24 hour   Intake             1800 ml   Output             2938 ml   Net            -1138 ml       Physical Exam:     Physical Exam   Constitutional: He is oriented to person, place, and time  He appears well-developed and well-nourished  HENT:   Head: Normocephalic and atraumatic  Mouth/Throat: Oropharynx is clear and moist    Eyes: EOM are normal  Pupils are equal, round, and reactive to light  Neck: Normal range of motion  Neck supple  No tracheal deviation present  Cardiovascular: Normal rate, regular rhythm and normal heart sounds  Pulmonary/Chest: Breath sounds normal  No respiratory distress  Unable to take a deep breath due to pain   Abdominal: Soft  Bowel sounds are normal  He exhibits distension  There is tenderness  There is guarding (Lower abdomen)  No rigidity or peritoneal signs   Musculoskeletal: He exhibits no edema or tenderness  Neurological: He is alert and oriented to person, place, and time  Skin: Skin is warm and dry  No rash noted  Psychiatric: He has a normal mood and affect   His behavior is normal        Additional Data:     Labs:      Results from last 7 days  Lab Units 09/25/18  0505   WBC Thousand/uL 7 70   HEMOGLOBIN g/dL 12 0*   HEMATOCRIT % 35 5*   PLATELETS Thousands/uL 245   NEUTROS PCT % 69*   LYMPHS PCT % 20   MONOS PCT % 9   EOS PCT % 2       Results from last 7 days  Lab Units 09/25/18  0505  09/22/18  2133   SODIUM mmol/L 136*  < > 135*   POTASSIUM mmol/L 3 4*  < > 4 0   CHLORIDE mmol/L 99  < > 96*   CO2 mmol/L 27  < > 25   BUN mg/dL 10  < > 20   CREATININE mg/dL 0 86  < > 1 17   CALCIUM mg/dL 8 2*  < > 9 4   ALK PHOS U/L  --   --  84   ALT U/L  --   --  39   AST U/L  --   --  32   < > = values in this interval not displayed  Results from last 7 days  Lab Units 09/23/18  0837   INR  1 23*                   Imaging:      Recent Cultures (last 7 days):           Last 24 Hours Medication List:     Current Facility-Administered Medications:  ciprofloxacin 400 mg Intravenous Q12H Pricilla Pires MD Last Rate: Stopped (09/25/18 0905)   lactated ringers 125 mL/hr Intravenous Continuous Luwana DO Manoj Last Rate: Stopped (09/25/18 0805)   metroNIDAZOLE 500 mg Intravenous Q8H Pricilla Pires MD Last Rate: 500 mg (09/25/18 0908)   morphine injection 4 mg Intravenous Q3H PRN Silviawana Manoj DO    ondansetron 4 mg Intravenous Once Luwana DO Manoj         Today, Patient Was Seen By: Jorge Aaron    ** Please Note: Dictation voice to text software may have been used in the creation of this document   **

## 2018-09-26 LAB
C DIFF TOX GENS STL QL NAA+PROBE: NORMAL
C DIFF TOX GENS STL QL NAA+PROBE: NORMAL

## 2018-09-26 PROCEDURE — 94640 AIRWAY INHALATION TREATMENT: CPT

## 2018-09-26 PROCEDURE — 94664 DEMO&/EVAL PT USE INHALER: CPT

## 2018-09-26 PROCEDURE — 99232 SBSQ HOSP IP/OBS MODERATE 35: CPT | Performed by: INTERNAL MEDICINE

## 2018-09-26 PROCEDURE — 87493 C DIFF AMPLIFIED PROBE: CPT | Performed by: FAMILY MEDICINE

## 2018-09-26 RX ORDER — POTASSIUM CHLORIDE 14.9 MG/ML
INJECTION INTRAVENOUS
Status: COMPLETED
Start: 2018-09-26 | End: 2018-09-26

## 2018-09-26 RX ORDER — ONDANSETRON 2 MG/ML
4 INJECTION INTRAMUSCULAR; INTRAVENOUS EVERY 6 HOURS PRN
Status: DISCONTINUED | OUTPATIENT
Start: 2018-09-26 | End: 2018-10-01 | Stop reason: HOSPADM

## 2018-09-26 RX ADMIN — MORPHINE SULFATE 4 MG: 4 INJECTION INTRAVENOUS at 09:14

## 2018-09-26 RX ADMIN — MORPHINE SULFATE 4 MG: 4 INJECTION INTRAVENOUS at 15:43

## 2018-09-26 RX ADMIN — POTASSIUM CHLORIDE 20 MEQ: 200 INJECTION, SOLUTION INTRAVENOUS at 00:21

## 2018-09-26 RX ADMIN — METRONIDAZOLE 500 MG: 500 INJECTION, SOLUTION INTRAVENOUS at 10:16

## 2018-09-26 RX ADMIN — ALBUTEROL SULFATE 2 PUFF: 90 AEROSOL, METERED RESPIRATORY (INHALATION) at 22:04

## 2018-09-26 RX ADMIN — CIPROFLOXACIN 400 MG: 2 INJECTION, SOLUTION INTRAVENOUS at 09:13

## 2018-09-26 RX ADMIN — SODIUM CHLORIDE, SODIUM LACTATE, POTASSIUM CHLORIDE, AND CALCIUM CHLORIDE 125 ML/HR: 600; 310; 30; 20 INJECTION, SOLUTION INTRAVENOUS at 15:43

## 2018-09-26 RX ADMIN — METRONIDAZOLE 500 MG: 500 INJECTION, SOLUTION INTRAVENOUS at 17:46

## 2018-09-26 RX ADMIN — SODIUM CHLORIDE, SODIUM LACTATE, POTASSIUM CHLORIDE, AND CALCIUM CHLORIDE 125 ML/HR: 600; 310; 30; 20 INJECTION, SOLUTION INTRAVENOUS at 06:15

## 2018-09-26 RX ADMIN — MORPHINE SULFATE 4 MG: 4 INJECTION INTRAVENOUS at 04:43

## 2018-09-26 RX ADMIN — METRONIDAZOLE 500 MG: 500 INJECTION, SOLUTION INTRAVENOUS at 00:13

## 2018-09-26 RX ADMIN — ONDANSETRON 4 MG: 2 INJECTION, SOLUTION INTRAMUSCULAR; INTRAVENOUS at 22:14

## 2018-09-26 RX ADMIN — CIPROFLOXACIN 400 MG: 2 INJECTION, SOLUTION INTRAVENOUS at 20:25

## 2018-09-26 NOTE — MEDICAL STUDENT
Assessment/Plan:  Perforation of sigmoid colon due to diverticulitis  · Admitted under surgery service   · Lactic acid WNL  · Pain is improving every day  Rates pain as 1/10 today and able to take deep breaths today  Had one cup of ice today  No peritoneal signs  · Tried incentive spirometry last night but it made his pain worse and caused him to become short of breath so he stopped and will try again today  Pulse ox 95% on RA  · Plan:  ? Incentive spirometry  ? Pending AM CBC monitor for improvement of white count  ? IV Cipro & Flagyl and continue to monitor  § Maybe consult GI If pain continues to be severe  ? Monitor for signs of sepsis  ? Pending stool cultures & C  Diff  ? Start ambulating today  Hypokalemia  · 3 4 yesterday morning, given KCl  · Plan: CMP today to monitor for improvement  Small bowel obstruction  · Secondary to acute diverticulitis  · Obstruction series still showed mild distention consistent with an ileus  · Pt had a large formed BM on Sunday night followed by watery diarrhea  Has had a bowel movement about once a day since then, both of which were watery and black  Not passing any gas but has been belching  No N/V/C  Abdominal pain improving  Bowel sounds are very diminished today compared to yesterday  · Plan:  ? Care as per primary team- surgery  ? Continue IVF  ? Start to slowly advance diet to clear liquids today  Ketonuria  · Most likely secondary to anorexia and NPO status  Moderate persistent asthma without complication  · Currently stable w/ no respiratory distress  · Plan: PRN nebulizer, oxygen if needed  ? Incentive spirometry    Subjective:   RS is a 37year old male, hospital day 3, admitted for diverticulitis with SBO and microperforation  Patient's pain has improved today and rates it as a 1/10 today which is still worsened with movement but much improved from yesterday  He had another BM last night which was still watery and black   Has not been passing any gas but has belched a few times since yesterday  He had one full cup of ice yesterday which he tolerated well  Ambulates to and from the restroom and states he feels he could walk around more today  Slept well overnight without any issues  States the incentive spirometer made him feel short of breath yesterday but is able to take deep breaths without pain today and states he will start using it more today  No longer feels short of breath at present  Denies CP, SOB, N/V/C, fever/chills, dizziness, weakness  Objective:     Vitals:   Temp (24hrs), Av 8 °F (36 6 °C), Min:97 4 °F (36 3 °C), Max:98 6 °F (37 °C)    HR:  [78-84] 80  Resp:  [18-20] 18  BP: (119-148)/(70-81) 119/70  SpO2:  [95 %-96 %] 95 %  Body mass index is 23 25 kg/m²  Input and Output Summary (last 24 hours): Intake/Output Summary (Last 24 hours) at 18 0746  Last data filed at 18 9100   Gross per 24 hour   Intake             2000 ml   Output             2403 ml   Net             -403 ml       Physical Exam:     Physical Exam   Constitutional: He appears well-developed and well-nourished  He appears distressed  HENT:   Head: Normocephalic and atraumatic  Mouth/Throat: Oropharynx is clear and moist    Eyes: EOM are normal  Pupils are equal, round, and reactive to light  Neck: Normal range of motion  Neck supple  Cardiovascular: Normal rate, regular rhythm and normal heart sounds  Pulmonary/Chest: Effort normal and breath sounds normal  He has no wheezes  He has no rales  Abdominal: Normal appearance  He exhibits distension and mass  Bowel sounds are decreased  There is tenderness in the right lower quadrant and left lower quadrant  There is guarding  There is no rigidity and no rebound  Abdomen is  with guarding present in the lower quadrants  Improved tenderness from yesterday but with diminished bowel sounds  Skin: He is not diaphoretic         Additional Data:     Labs:      Results from last 7 days  Lab Units 09/25/18  0505   WBC Thousand/uL 7 70   HEMOGLOBIN g/dL 12 0*   HEMATOCRIT % 35 5*   PLATELETS Thousands/uL 245   NEUTROS PCT % 69*   LYMPHS PCT % 20   MONOS PCT % 9   EOS PCT % 2       Results from last 7 days  Lab Units 09/25/18  0505  09/22/18  2133   SODIUM mmol/L 136*  < > 135*   POTASSIUM mmol/L 3 4*  < > 4 0   CHLORIDE mmol/L 99  < > 96*   CO2 mmol/L 27  < > 25   BUN mg/dL 10  < > 20   CREATININE mg/dL 0 86  < > 1 17   CALCIUM mg/dL 8 2*  < > 9 4   ALK PHOS U/L  --   --  84   ALT U/L  --   --  39   AST U/L  --   --  32   < > = values in this interval not displayed  Results from last 7 days  Lab Units 09/23/18  0837   INR  1 23*                     Imaging:        Recent Cultures (last 7 days):           Last 24 Hours Medication List:     Current Facility-Administered Medications:  albuterol 2 puff Inhalation Q4H PRN Yazmin Aguila MD    ciprofloxacin 400 mg Intravenous Q12H Pricilla Pires MD Last Rate: 400 mg (09/25/18 2047)   lactated ringers 125 mL/hr Intravenous Continuous Rob Lather, DO Last Rate: 125 mL/hr (09/26/18 0615)   metroNIDAZOLE 500 mg Intravenous Q8H Pricilla iPres MD Last Rate: 500 mg (09/26/18 0013)   morphine injection 4 mg Intravenous Q3H PRN Rob Lather, DO    ondansetron 4 mg Intravenous Once Rob Lather, DO         Today, Patient Was Seen By: Joon Hinojosa    ** Please Note: Dictation voice to text software may have been used in the creation of this document   **

## 2018-09-26 NOTE — PROGRESS NOTES
Less pain  Tenderness in LLQ with no guarding  Impression: Diverticulitis improving  Plan: start clear liquids

## 2018-09-26 NOTE — SOCIAL WORK
ZONIA notes this pt continues to complain of abdominal pain with round the clock pain meds given  Pt has not been cooperative with getting out of bed and moving around  Pt placed on clear liquid diet this morning  SW continues to follow pt's progress

## 2018-09-26 NOTE — PROGRESS NOTES
Progress Note - Parrish Valencia 1975, 37 y o  male MRN: [id number]    Unit/Bed#: 7T [facility] 710-02 Encounter: [id number]    Primary Care Provider: Alize Dawn DO   Date and time admitted to hospital: 9/22/2018  9:04 PM        Abdominal pain   Assessment & Plan    Secondary to acute diverticulitis  Continue with IV analgesia  Pain management per surgeon  Improving secondary to acute diverticulitis        Small bowel obstruction ([address] )   Assessment & Plan    Secondary to acute diverticulitis  Care as per primary team-surgery  NG-tube attempted  Maintain NPO status  Obstructive series noted most probably ileus secondary to perforated microperforation diverticulitis with adjacent small-bowel inflamed        Moderate persistent asthma without complication   Assessment & Plan    Currently stable with no respiratory distress or hypoxia  Continue with nebulizers and oxygen if needed  Lungs clear continue to watch  No shortness of breath no wheezing  Continue incentive spirometry        * Perforation of sigmoid colon due to diverticulitis   Assessment & Plan    Patient with microperforation of the sigmoid colon due to diverticulitis  Admitted under surgery service  Patient with significant abdominal pain and tenderness  Continue to monitor closely for worsening signs and symptoms of sepsis  Acute diverticulitis few micro perforation with adjacent is more bowel inflamed  Management per surgeon  Continue antibiotic pain is improving  Move the bowel patient is started clear liquid  Tolerating okay  Had loose stool              VTE Pharmacologic Prophylaxis:   Pharmacologic: Pharmacologic VTE Prophylaxis contraindicated due to Due to acute diverticulitis may need surgery  Mechanical VTE Prophylaxis in Place: Yes    Patient Centered Rounds: I have performed bedside rounds with nursing staff today      Discussions with Specialists or Other Care Team Provider:   No    Education and Discussions with Family / Patient:   With patient    Time Spent for Care: 30 minutes  More than 50% of total time spent on counseling and coordination of care as described above  Current Length of Stay: 4 day(s)    Current Patient Status: Inpatient   Certification Statement: The patient will continue to require additional inpatient hospital stay due to For medical management    Discharge Plan:   Next 2-3 days    Code Status: Level 1 - Full Code      Subjective:   Abdominal pain is better but having diarrhea no shortness of breath no chest pain tolerating p o  intake well started clear liquid    Objective:     Vitals:   Temp (24hrs), Av 8 °F (36 6 °C), Min:97 4 °F (36 3 °C), Max:98 6 °F (37 °C)    HR:  [68-80] 68  Resp:  [18-20] 18  BP: (119-148)/(70-81) 125/72  SpO2:  [95 %-96 %] 96 %  Body mass index is 23 25 kg/m²  Input and Output Summary (last 24 hours): Intake/Output Summary (Last 24 hours) at 18 1751  Last data filed at 18 1300   Gross per 24 hour   Intake             2360 ml   Output             2150 ml   Net              210 ml       Physical Exam:     Physical Exam   Constitutional: He is oriented to person, place, and time  He appears well-developed and well-nourished  HENT:   Head: Normocephalic and atraumatic  Right Ear: External ear normal    Left Ear: External ear normal    Mouth/Throat: Oropharynx is clear and moist    Eyes: Conjunctivae and EOM are normal  Pupils are equal, round, and reactive to light  Neck: Normal range of motion  Neck supple  Cardiovascular: Normal rate, regular rhythm, normal heart sounds and intact distal pulses  Pulmonary/Chest: Effort normal and breath sounds normal    Abdominal: Soft  Bowel sounds are normal  He exhibits distension  He exhibits no mass  There is tenderness  There is no rebound and no guarding     Bowel sound positive but diminished decrease tenderness and guarding decreased distension   Genitourinary:   Genitourinary Comments: deferred   Musculoskeletal: Normal range of motion  Neurological: He is alert and oriented to person, place, and time  He has normal reflexes  Skin: Skin is warm and dry  No rash noted  Psychiatric: He has a normal mood and affect  Nursing note and vitals reviewed  Additional Data:     Labs:      Results from last 7 days  Lab Units 09/25/18  0505   WBC Thousand/uL 7 70   HEMOGLOBIN g/dL 12 0*   HEMATOCRIT % 35 5*   PLATELETS Thousands/uL 245   NEUTROS PCT % 69*   LYMPHS PCT % 20   MONOS PCT % 9   EOS PCT % 2       Results from last 7 days  Lab Units 09/25/18  0505  09/22/18  2133   SODIUM mmol/L 136*  < > 135*   POTASSIUM mmol/L 3 4*  < > 4 0   CHLORIDE mmol/L 99  < > 96*   CO2 mmol/L 27  < > 25   BUN mg/dL 10  < > 20   CREATININE mg/dL 0 86  < > 1 17   CALCIUM mg/dL 8 2*  < > 9 4   ALK PHOS U/L  --   --  84   ALT U/L  --   --  39   AST U/L  --   --  32   < > = values in this interval not displayed  Results from last 7 days  Lab Units 09/23/18  0837   INR  1 23*                 * I Have Reviewed All Lab Data Listed Above  * Additional Pertinent Lab Tests Reviewed: [facility]    Imaging:    Imaging Reports Reviewed Today Include:   None today  Imaging Personally Reviewed by Myself Includes:  Reviewed    Recent Cultures (last 7 days):       Results from last 7 days  Lab Units 09/26/18  0440 09/25/18  0832   C DIFF TOXIN B  NEGATIVE for C difficle toxin by PCR  NEGATIVE for C difficle toxin by PCR  Last 24 Hours Medication List:     Current Facility-Administered Medications:  albuterol 2 puff Inhalation Q4H PRN Ivania Porter MD    ciprofloxacin 400 mg Intravenous Q12H Pricilla Pires MD Last Rate: Stopped (09/26/18 1015)   lactated ringers 125 mL/hr Intravenous Continuous Sravan Gonzalez DO Last Rate: 125 mL/hr (09/26/18 1543)   metroNIDAZOLE 500 mg Intravenous Q8H Pricilla Pires MD Last Rate: 500 mg (09/26/18 1746)   morphine injection 4 mg Intravenous Q3H PRN Sravan Gonzalez DO ondansetron 4 mg Intravenous Once Patti Delgado DO         Today, Patient Was Seen By: Lorena Alberto MD    ** Please Note: Dictation voice to text software may have been used in the creation of this document   **

## 2018-09-26 NOTE — CASE MANAGEMENT
Continued Stay Review    Date:  9/26/2018    Vital Signs: /72 (BP Location: Left arm)   Pulse 68   Temp 97 5 °F (36 4 °C) (Temporal)   Resp 18   Ht 5' 9" (1 753 m)   Wt 71 4 kg (157 lb 6 5 oz)   SpO2 96%   BMI 23 25 kg/m²     Medications:   Scheduled Meds:   Current Facility-Administered Medications:          ciprofloxacin 400 mg Intravenous Q12H Bilal JEAN PAUL Pires MD Last Rate: Stopped (09/26/18 1015)           metroNIDAZOLE 500 mg Intravenous Q8H Bilal JEAN PAUL Pires MD Last Rate: Stopped (09/26/18 1116)           ondansetron 4 mg Intravenous Once Ranny Clos, DO      Continuous Infusions:   lactated ringers 125 mL/hr Last Rate: 125 mL/hr (09/26/18 1543)     PRN Meds:   albuterol    morphine injection IV x 3     Abnormal Labs/Diagnostic Results:     Age/Sex: 37 y o  male     Assessment/Plan:   Still with Abd pain secondary to acute diverticulitis with perforation of sigmoid colon - cont with IV Abx's, pain meds  Obstruction series 9/23 still showed mild distention consistent with an ileus  BM x 1 watery and black last nite  Start clears today  KCL 20 IV X 2 today  C Diff neg    Discharge Plan: TBD

## 2018-09-26 NOTE — NURSING NOTE
Morphine given at 2207 for 6/10 lower abd pain  At present Pt denies any pain  No acute distress noted  No BM since start of shift  Specimen needed  No other changes from previous shift assessment  Call bell in reach, will continue to monitor

## 2018-09-26 NOTE — NURSING NOTE
Pt received this am, a+o, resting in bed, tolerating clear liquids no nausea, no distress, will monitor

## 2018-09-27 LAB
ANION GAP SERPL CALCULATED.3IONS-SCNC: 7 MMOL/L (ref 5–14)
BUN SERPL-MCNC: 6 MG/DL (ref 5–25)
CALCIUM SERPL-MCNC: 8.5 MG/DL (ref 8.4–10.2)
CHLORIDE SERPL-SCNC: 102 MMOL/L (ref 97–108)
CO2 SERPL-SCNC: 29 MMOL/L (ref 22–30)
CREAT SERPL-MCNC: 0.8 MG/DL (ref 0.7–1.5)
GFR SERPL CREATININE-BSD FRML MDRD: 125 ML/MIN/1.73SQ M
GLUCOSE SERPL-MCNC: 96 MG/DL (ref 70–99)
POTASSIUM SERPL-SCNC: 3.5 MMOL/L (ref 3.6–5)
SODIUM SERPL-SCNC: 138 MMOL/L (ref 137–147)

## 2018-09-27 PROCEDURE — 99233 SBSQ HOSP IP/OBS HIGH 50: CPT | Performed by: INTERNAL MEDICINE

## 2018-09-27 PROCEDURE — 94640 AIRWAY INHALATION TREATMENT: CPT

## 2018-09-27 PROCEDURE — 94760 N-INVAS EAR/PLS OXIMETRY 1: CPT

## 2018-09-27 PROCEDURE — 80048 BASIC METABOLIC PNL TOTAL CA: CPT | Performed by: INTERNAL MEDICINE

## 2018-09-27 PROCEDURE — 94664 DEMO&/EVAL PT USE INHALER: CPT

## 2018-09-27 RX ORDER — POTASSIUM CHLORIDE 14.9 MG/ML
40 INJECTION INTRAVENOUS
Status: DISPENSED | OUTPATIENT
Start: 2018-09-27 | End: 2018-09-27

## 2018-09-27 RX ADMIN — METRONIDAZOLE 500 MG: 500 INJECTION, SOLUTION INTRAVENOUS at 09:59

## 2018-09-27 RX ADMIN — SODIUM CHLORIDE, SODIUM LACTATE, POTASSIUM CHLORIDE, AND CALCIUM CHLORIDE 125 ML/HR: 600; 310; 30; 20 INJECTION, SOLUTION INTRAVENOUS at 00:47

## 2018-09-27 RX ADMIN — MORPHINE SULFATE 4 MG: 4 INJECTION INTRAVENOUS at 01:10

## 2018-09-27 RX ADMIN — METRONIDAZOLE 500 MG: 500 INJECTION, SOLUTION INTRAVENOUS at 00:47

## 2018-09-27 RX ADMIN — CIPROFLOXACIN 400 MG: 2 INJECTION, SOLUTION INTRAVENOUS at 19:50

## 2018-09-27 RX ADMIN — METRONIDAZOLE 500 MG: 500 INJECTION, SOLUTION INTRAVENOUS at 18:03

## 2018-09-27 RX ADMIN — SODIUM CHLORIDE, SODIUM LACTATE, POTASSIUM CHLORIDE, AND CALCIUM CHLORIDE 125 ML/HR: 600; 310; 30; 20 INJECTION, SOLUTION INTRAVENOUS at 19:50

## 2018-09-27 RX ADMIN — CIPROFLOXACIN 400 MG: 2 INJECTION, SOLUTION INTRAVENOUS at 08:11

## 2018-09-27 RX ADMIN — MORPHINE SULFATE 4 MG: 4 INJECTION INTRAVENOUS at 05:08

## 2018-09-27 RX ADMIN — ALBUTEROL SULFATE 2 PUFF: 90 AEROSOL, METERED RESPIRATORY (INHALATION) at 20:59

## 2018-09-27 NOTE — NURSING NOTE
No further c/o nausea  No c/o pain or SOB  IVF infusing as ordered  No other changes from past shift assessment  No acute distress noted  Call bell in reach, will continue to monitor

## 2018-09-27 NOTE — MEDICAL STUDENT
Subjective:   Parrish Valencia is a 37 y o  male who was admitted to the hospital for sigmoid diverticulitis with microperforation and possible SBO, who currently has 2/10 pain  He says that the pain is better in general today than yesterday, and that most of the day it has been 0/10 pain with just mild tenderness in the lower quadrants  The pain has been intermittent today, and is relieved by resting and his pain medications  He states that walking in general today has not made his pain worse, although once when he got up to go to the bathroom the pain increased to a 2/10 pain  He was on clears both yesterday and today  Last night he did feel briefly nauseous but it was relieved with Zofran  He did not vomit, and he has not had any nausea since then  This morning he did not have an appetite, and did not eat his meal; however, for lunch today, he did have soup, cranberry and apple juice, without developing nausea  He had one loose stool yesterday, and one today at 2:30 pm  Patient ambulated two times around the hallway today  ROS:  Heart: No palpitations, No CP  Lungs: No SOB  No coughing  No coughing up blood  Abdomen: 2/10 pain in the RLQ and LLQ  No N/V  Reports diarrhea  Denies bloating  Vascular: No lower extremity swelling  Objective:   Vitals: Temp: 97 2 F; P: 67; Resp: 16; BP: 125/67; spO2: 93%  General: WNWD, in NAD, and appears alert, but tired  HEENT: Normocephalic, atraumatic  Heart: RRR, no M/R/G  Pulm: CTA bilaterally, no W/R/R  Abd: slightly hypoactive bowel sounds x 4 quadrants  Abdomen is soft, but tender to light palpation in the lower quadrants  Vascular: No lower extremity edema  IV was disconnected per nursing to [facility] to allow for patient to ambulate  Assessment:   1  Sigmoid diverticulitis with perforation; Clinically improving  2  SBO - Resolved, with possible ileus  3  Hypokalemia - 3 5 mmol/L     Plan:   1  Continue pain medications per protocol   Give Ciprofloxacin and Metronidazole as prescribed  Continue on clear liquid diet  Monitor for pain, N/V  Notify Dr Lanza Falls with any N/V   3  To be managed by the Hospitalists      SARAH Bishop

## 2018-09-27 NOTE — NURSING NOTE
Pt c/o nausea, No PRN antiemetic medication order  Dr Prashanth perry  Obtained order for Zofran and given at 7474  Will continue to monitor  Call bell in reach

## 2018-09-27 NOTE — PROGRESS NOTES
Progress Note - Fitz Mejia 1975, 37 y o  male MRN: [id number]    Unit/Bed#: 7T [facility] 710-02 Encounter: [id number]    Primary Care Provider: Cookie Torres DO   Date and time admitted to hospital: 2018  9:04 PM  1  Sigmoid diverticulitis/perforated sigmoid diverticulitis  Some clinical improvement reported  Patient is able to tolerate clear liquid diet  Also appetite is poor  Will continue all current management at this time  General surgery is primary team     2    Small-bowel obstruction  Improving currently resolved  3   History of moderate persistent asthma  No acute exacerbation  Discussed asthma action plan  Continue current care    4 hypokalemia   Will give 40 mEq of IV potassium chloride  This is likely due to decreased p o  Intake      VTE Pharmacologic Prophylaxis:   Pharmacologic: Pharmacologic VTE Prophylaxis contraindicated due to Due to acute diverticulitis may need surgery  Mechanical VTE Prophylaxis in Place: Yes    Patient Centered Rounds: I have performed bedside rounds with nursing staff today  Discussions with Specialists or Other Care Team Provider:   No    Education and Discussions with Family / Patient:   With patient    Time Spent for Care: 30 minutes  More than 50% of total time spent on counseling and coordination of care as described above  Current Length of Stay: 5 day(s)    Current Patient Status: Inpatient   Certification Statement: The patient will continue to require additional inpatient hospital stay due to For medical management    Discharge Plan:   Next 2-3 days    Code Status: Level 1 - Full Code      Subjective:   They allowed me to eat but I have no appetite, I do not like liquid diet   Objective:     Vitals:   Temp (24hrs), Av 2 °F (36 8 °C), Min:97 2 °F (36 2 °C), Max:98 8 °F (37 1 °C)    HR:  [64-82] 67  Resp:  [16-18] 16  BP: (120-125)/(67-83) 125/67  SpO2:  [93 %-96 %] 93 %  Body mass index is 23 25 kg/m²       Input and Output Summary (last 24 hours): Intake/Output Summary (Last 24 hours) at 09/27/18 1104  Last data filed at 09/27/18 0653   Gross per 24 hour   Intake              900 ml   Output              850 ml   Net               50 ml       Physical Exam:     Physical Exam   Constitutional: He is oriented to person, place, and time  He appears well-developed and well-nourished  HENT:   Head: Normocephalic and atraumatic  Right Ear: External ear normal    Left Ear: External ear normal    Mouth/Throat: Oropharynx is clear and moist    Eyes: Pupils are equal, round, and reactive to light  Conjunctivae and EOM are normal    Neck: Normal range of motion  Neck supple  Cardiovascular: Normal rate, regular rhythm, normal heart sounds and intact distal pulses  Pulmonary/Chest: Effort normal and breath sounds normal    Abdominal: Soft  Bowel sounds are normal  He exhibits distension  He exhibits no mass  There is tenderness  There is no rebound and no guarding  Bowel sound positive but diminished decrease tenderness and guarding decreased distension   Genitourinary:   Genitourinary Comments: deferred   Musculoskeletal: Normal range of motion  Neurological: He is alert and oriented to person, place, and time  He has normal reflexes  Skin: Skin is warm and dry  No rash noted  Psychiatric: He has a normal mood and affect  Nursing note and vitals reviewed          Additional Data:     Labs:      Results from last 7 days  Lab Units 09/25/18  0505   WBC Thousand/uL 7 70   HEMOGLOBIN g/dL 12 0*   HEMATOCRIT % 35 5*   PLATELETS Thousands/uL 245   NEUTROS PCT % 69*   LYMPHS PCT % 20   MONOS PCT % 9   EOS PCT % 2       Results from last 7 days  Lab Units 09/27/18  0503  09/22/18  2133   SODIUM mmol/L 138  < > 135*   POTASSIUM mmol/L 3 5*  < > 4 0   CHLORIDE mmol/L 102  < > 96*   CO2 mmol/L 29  < > 25   BUN mg/dL 6  < > 20   CREATININE mg/dL 0 80  < > 1 17   CALCIUM mg/dL 8 5  < > 9 4   ALK PHOS U/L  --   --  84   ALT U/L  --   --  39   AST U/L  --   --  32   < > = values in this interval not displayed  Results from last 7 days  Lab Units 09/23/18  0837   INR  1 23*                 * I Have Reviewed All Lab Data Listed Above  * Additional Pertinent Lab Tests Reviewed: [facility] Admission Reviewed    Imaging:    Imaging Reports Reviewed Today Include:   None today  Imaging Personally Reviewed by Myself Includes:  Reviewed    Recent Cultures (last 7 days):       Results from last 7 days  Lab Units 09/26/18  0440 09/25/18  0832   C DIFF TOXIN B  NEGATIVE for C difficle toxin by PCR  NEGATIVE for C difficle toxin by PCR  Last 24 Hours Medication List:     Current Facility-Administered Medications:  albuterol 2 puff Inhalation Q4H PRN Herbie Wilkerson MD    ciprofloxacin 400 mg Intravenous Q12H Pricilla Pires MD Last Rate: 400 mg (09/27/18 0811)   lactated ringers 125 mL/hr Intravenous Continuous Grace Nirav, DO Last Rate: 125 mL/hr (09/27/18 0047)   metroNIDAZOLE 500 mg Intravenous Q8H Pricilla Pires MD Last Rate: 500 mg (09/27/18 0959)   morphine injection 4 mg Intravenous Q3H PRN Grace Nirav, DO    ondansetron 4 mg Intravenous Once Grace Nirav, DO    ondansetron 4 mg Intravenous Q6H PRN Grace Gastelum DO         Today, Patient Was Seen By: Nell Puente    ** Please Note: Dictation voice to text software may have been used in the creation of this document   **

## 2018-09-27 NOTE — PLAN OF CARE
DISCHARGE PLANNING     Discharge to home or other facility with appropriate resources Progressing        INFECTION - ADULT     Absence or prevention of progression during hospitalization Progressing        Knowledge Deficit     Patient/family/caregiver demonstrates understanding of disease process, treatment plan, medications, and discharge instructions Progressing        SAFETY ADULT     Patient will remain free of falls Progressing

## 2018-09-28 PROCEDURE — 94664 DEMO&/EVAL PT USE INHALER: CPT

## 2018-09-28 PROCEDURE — 94760 N-INVAS EAR/PLS OXIMETRY 1: CPT

## 2018-09-28 PROCEDURE — 94640 AIRWAY INHALATION TREATMENT: CPT

## 2018-09-28 PROCEDURE — 99232 SBSQ HOSP IP/OBS MODERATE 35: CPT | Performed by: INTERNAL MEDICINE

## 2018-09-28 RX ORDER — POTASSIUM CHLORIDE 750 MG/1
40 TABLET, EXTENDED RELEASE ORAL ONCE
Status: COMPLETED | OUTPATIENT
Start: 2018-09-28 | End: 2018-09-28

## 2018-09-28 RX ADMIN — SODIUM CHLORIDE, SODIUM LACTATE, POTASSIUM CHLORIDE, AND CALCIUM CHLORIDE 125 ML/HR: 600; 310; 30; 20 INJECTION, SOLUTION INTRAVENOUS at 16:04

## 2018-09-28 RX ADMIN — ALBUTEROL SULFATE 2 PUFF: 90 AEROSOL, METERED RESPIRATORY (INHALATION) at 22:44

## 2018-09-28 RX ADMIN — MORPHINE SULFATE 4 MG: 4 INJECTION INTRAVENOUS at 07:30

## 2018-09-28 RX ADMIN — METRONIDAZOLE 500 MG: 500 INJECTION, SOLUTION INTRAVENOUS at 00:20

## 2018-09-28 RX ADMIN — MORPHINE SULFATE 2 MG: 2 INJECTION, SOLUTION INTRAMUSCULAR; INTRAVENOUS at 17:00

## 2018-09-28 RX ADMIN — METRONIDAZOLE 500 MG: 500 INJECTION, SOLUTION INTRAVENOUS at 09:26

## 2018-09-28 RX ADMIN — POTASSIUM CHLORIDE 40 MEQ: 10 TABLET, EXTENDED RELEASE ORAL at 14:17

## 2018-09-28 RX ADMIN — ALBUTEROL SULFATE 2 PUFF: 90 AEROSOL, METERED RESPIRATORY (INHALATION) at 17:09

## 2018-09-28 RX ADMIN — MORPHINE SULFATE 2 MG: 2 INJECTION, SOLUTION INTRAMUSCULAR; INTRAVENOUS at 23:45

## 2018-09-28 RX ADMIN — SODIUM CHLORIDE, SODIUM LACTATE, POTASSIUM CHLORIDE, AND CALCIUM CHLORIDE 125 ML/HR: 600; 310; 30; 20 INJECTION, SOLUTION INTRAVENOUS at 05:10

## 2018-09-28 RX ADMIN — CIPROFLOXACIN 400 MG: 2 INJECTION, SOLUTION INTRAVENOUS at 19:12

## 2018-09-28 RX ADMIN — METRONIDAZOLE 500 MG: 500 INJECTION, SOLUTION INTRAVENOUS at 16:05

## 2018-09-28 RX ADMIN — CIPROFLOXACIN 400 MG: 2 INJECTION, SOLUTION INTRAVENOUS at 07:43

## 2018-09-28 NOTE — MEDICAL STUDENT
Assessment/Plan:  Perforation of sigmoid colon due to diverticulitis  · Admitted under surgery service   · White count is now down and WNL, Lactic acid WNL  · Pain is improving every day  Able to take deep breaths today  No peritoneal signs  · All vitals stable  · C  Diff PCR negative  · Plan:  ? Incentive spirometry  ? Pending Stool bacteria PCR  ? IV Cipro & Flagyl and continue to monitor  § Maybe consult GI If pain continues to be severe  ? Monitor for signs of sepsis  ? Start ambulating today  ? Morphine 4mg/mL injection Q3 PRN severe pain  ? Zofran 4mg injection Q6 PRN nausea  Hypokalemia  · 3 5 yesterday morning, given 40mEq IV KCl  · Plan:   · CMP today to monitor for improvement  Small bowel obstruction  · Obstruction resolved - now with possible ileus  ? Obstruction series still showed mild distention consistent with an ileus  · Pt had a large formed BM on Sunday night followed by watery diarrhea  Has had a BM about once a day since then, both of which were watery and black  Not passing any gas but has been belching  · Started on clear liquids 9/26, after which he experienced some nausea improved with Zofran but no V/C  · Abdominal pain improving  · Bowel sounds are very diminished  · Plan:  ? Management as above  ? Care as per primary team - surgery  ? Continue IV  mL/hr  ? Start to slowly advance diet to soft foods today  Ketonuria  · Most likely secondary to anorexia and decreased PO intake  Moderate persistent asthma without complication  · Currently stable w/ no respiratory distress  · Plan:   ? PRN nebulizer, oxygen if needed  ? Incentive spirometry    Subjective:   RS is a 37year old male, hospital day 3, admitted for diverticulitis with SBO and microperforation  Patient's pain has improved today and rates it as a 1/10 today which is still worsened with movement but much improved from yesterday  Denies CP/SOB or N/V/C, still moving his bowels      Objective:     Vitals:   Temp (24hrs), Av 5 °F (36 4 °C), Min:96 8 °F (36 °C), Max:98 °F (36 7 °C)    HR:  [63-81] 65  Resp:  [18] 18  BP: (119-135)/(68-89) 119/74  SpO2:  [95 %-98 %] 97 %  Body mass index is 23 25 kg/m²  Input and Output Summary (last 24 hours): Intake/Output Summary (Last 24 hours) at 18 1036  Last data filed at 18 0901   Gross per 24 hour   Intake             2280 ml   Output              775 ml   Net             1505 ml       Physical Exam:     Physical Exam   Constitutional: He is oriented to person, place, and time  He appears well-developed and well-nourished  He appears distressed  HENT:   Head: Normocephalic and atraumatic  Mouth/Throat: Oropharynx is clear and moist    Eyes: Pupils are equal, round, and reactive to light  EOM are normal    Neck: Normal range of motion  Neck supple  Cardiovascular: Normal rate, regular rhythm and normal heart sounds  Pulmonary/Chest: Effort normal and breath sounds normal  He has no wheezes  He has no rales  Abdominal: Normal appearance  He exhibits distension  He exhibits no mass  Bowel sounds are decreased  There is tenderness in the right lower quadrant and left lower quadrant  There is guarding  There is no rigidity and no rebound  Abdomen is  with guarding present in the lower quadrants  Pain improving every day  Musculoskeletal: He exhibits no edema or tenderness  Neurological: He is alert and oriented to person, place, and time  Skin: Skin is warm and dry  He is not diaphoretic  Psychiatric: He has a normal mood and affect   His behavior is normal          Additional Data:     Labs:      Results from last 7 days  Lab Units 18  0505   WBC Thousand/uL 7 70   HEMOGLOBIN g/dL 12 0*   HEMATOCRIT % 35 5*   PLATELETS Thousands/uL 245   NEUTROS PCT % 69*   LYMPHS PCT % 20   MONOS PCT % 9   EOS PCT % 2       Results from last 7 days  Lab Units 18  0503  18  2133   SODIUM mmol/L 138  < > 135*   POTASSIUM mmol/L 3 5*  < > 4  0   CHLORIDE mmol/L 102  < > 96*   CO2 mmol/L 29  < > 25   BUN mg/dL 6  < > 20   CREATININE mg/dL 0 80  < > 1 17   CALCIUM mg/dL 8 5  < > 9 4   ALK PHOS U/L  --   --  84   ALT U/L  --   --  39   AST U/L  --   --  32   < > = values in this interval not displayed  Results from last 7 days  Lab Units 09/23/18  0837   INR  1 23*                     Imaging:        Recent Cultures (last 7 days):       Results from last 7 days  Lab Units 09/26/18  0440 09/25/18  0832   C DIFF TOXIN B  NEGATIVE for C difficle toxin by PCR  NEGATIVE for C difficle toxin by PCR  Last 24 Hours Medication List:     Current Facility-Administered Medications:  albuterol 2 puff Inhalation Q4H PRN Varinder Tapia MD    ciprofloxacin 400 mg Intravenous Q12H Pricilla Pires MD Last Rate: 400 mg (09/28/18 0825)   lactated ringers 125 mL/hr Intravenous Continuous Seabron Rowels, DO Last Rate: 125 mL/hr (09/28/18 3675)   metroNIDAZOLE 500 mg Intravenous Q8H Pricilla Pires MD Last Rate: 500 mg (09/28/18 9766)   morphine injection 4 mg Intravenous Q3H PRN Seabron Rowels, DO    ondansetron 4 mg Intravenous Once Seabron Rowels, DO    ondansetron 4 mg Intravenous Q6H PRN Seabron Rowels, DO         Today, Patient Was Seen By: Troy Carcamo    ** Please Note: Dictation voice to text software may have been used in the creation of this document   **

## 2018-09-28 NOTE — PROGRESS NOTES
Progress Note - Allen Saab 1975, 37 y o  male MRN: [id number]    Unit/Bed#: 7T [facility] 710-02 Encounter: [id number]    Primary Care Provider: Mira Bloch, DO   Date and time admitted to hospital: 9/22/2018  9:04 PM        Abdominal pain   Assessment & Plan    Secondary to acute diverticulitis  Continue with IV analgesia  Pain management per surgeon  Improving secondary to acute diverticulitis  Decreased pain medication     Small bowel obstruction ([address] )   Assessment & Plan    Secondary to acute diverticulitis  Care as per primary team-surgery  NG-tube attempted  Maintain IV fluid started clear liquid tolerating well positive bowel sounds  Obstructive series noted most probably ileus secondary to perforated microperforation diverticulitis with adjacent small-bowel inflamed  To loose stool having diarrhea order C diff     Moderate persistent asthma without complication   Assessment & Plan    Currently stable with no respiratory distress or hypoxia  Continue with nebulizers and oxygen if needed  Lungs clear continue to watch  No shortness of breath no wheezing  Continue incentive spirometry     * Perforation of sigmoid colon due to diverticulitis   Assessment & Plan    Patient with microperforation of the sigmoid colon due to diverticulitis  Admitted under surgery service  Patient with significant abdominal pain and tenderness  Continue to monitor closely for worsening signs and symptoms of sepsis  Acute diverticulitis few micro perforation with adjacent is more bowel inflamed  Management per surgeon  Continue antibiotic pain is improving  Move the bowel patient is started clear liquid  Continue clear liquid  Pain is much controlled decreased pain medication consider increasing it diet to soft diet                     VTE Pharmacologic Prophylaxis:   Pharmacologic:  No anticoagulation may need surgical intervention due to acute diverticulitis  Mechanical VTE Prophylaxis in Place: Yes    Patient Centered Rounds: I have performed bedside rounds with nursing staff today  Discussions with Specialists or Other Care Team Provider:   With surgeon    Education and Discussions with Family / Patient:   Patient    Time Spent for Care: 30 minutes  More than 50% of total time spent on counseling and coordination of care as described above  Current Length of Stay: 6 day(s)    Current Patient Status: Inpatient   Certification Statement: The patient will continue to require additional inpatient hospital stay due to 24 hr    Discharge Plan: May be next 24 hr    Code Status: Level 1 - Full Code      Subjective:   Abdominal pain is better tolerating clear liquid having diarrhea loose stool no chest pain or shortness of breath patient out of bed in chair as ambulate in room    Objective:     Vitals:   Temp (24hrs), Av 5 °F (36 4 °C), Min:96 8 °F (36 °C), Max:98 °F (36 7 °C)    HR:  [63-81] 65  Resp:  [18] 18  BP: (119-135)/(68-89) 119/74  SpO2:  [95 %-98 %] 97 %  Body mass index is 23 25 kg/m²  Input and Output Summary (last 24 hours): Intake/Output Summary (Last 24 hours) at 18 1243  Last data filed at 18 1108   Gross per 24 hour   Intake             2040 ml   Output              775 ml   Net             1265 ml       Physical Exam:     Physical Exam   Constitutional: He is oriented to person, place, and time  He appears well-developed and well-nourished  HENT:   Head: Normocephalic and atraumatic  Right Ear: External ear normal    Left Ear: External ear normal    Mouth/Throat: Oropharynx is clear and moist    Eyes: Pupils are equal, round, and reactive to light  Conjunctivae and EOM are normal    Neck: Normal range of motion  Neck supple  Cardiovascular: Normal rate, regular rhythm, normal heart sounds and intact distal pulses  Pulmonary/Chest: Effort normal and breath sounds normal    Abdominal: Soft  Bowel sounds are normal  He exhibits no mass  There is no tenderness   There is no rebound and no guarding  Abdominal decreased distant decrease  generalized tenderness hour sound present but is still tenderness lower abdomen   Genitourinary:   Genitourinary Comments: deferred   Musculoskeletal: Normal range of motion  Neurological: He is alert and oriented to person, place, and time  He has normal reflexes  Skin: Skin is warm and dry  No rash noted  Psychiatric: He has a normal mood and affect  Nursing note and vitals reviewed  Additional Data:     Labs:      Results from last 7 days  Lab Units 09/25/18  0505   WBC Thousand/uL 7 70   HEMOGLOBIN g/dL 12 0*   HEMATOCRIT % 35 5*   PLATELETS Thousands/uL 245   NEUTROS PCT % 69*   LYMPHS PCT % 20   MONOS PCT % 9   EOS PCT % 2       Results from last 7 days  Lab Units 09/27/18  0503  09/22/18  2133   SODIUM mmol/L 138  < > 135*   POTASSIUM mmol/L 3 5*  < > 4 0   CHLORIDE mmol/L 102  < > 96*   CO2 mmol/L 29  < > 25   BUN mg/dL 6  < > 20   CREATININE mg/dL 0 80  < > 1 17   CALCIUM mg/dL 8 5  < > 9 4   ALK PHOS U/L  --   --  84   ALT U/L  --   --  39   AST U/L  --   --  32   < > = values in this interval not displayed  Results from last 7 days  Lab Units 09/23/18  0837   INR  1 23*                 * I Have Reviewed All Lab Data Listed Above  * Additional Pertinent Lab Tests Reviewed: All Labs For Current Hospital Admission Reviewed    Imaging:    Imaging Reports Reviewed Today Include:   None today  Imaging Personally Reviewed by Myself Includes: Old reviewed    Recent Cultures (last 7 days):       Results from last 7 days  Lab Units 09/26/18  0440 09/25/18  0832   C DIFF TOXIN B  NEGATIVE for C difficle toxin by PCR  NEGATIVE for C difficle toxin by PCR  Last 24 Hours Medication List:     Current Facility-Administered Medications:  albuterol 2 puff Inhalation Q4H PRN Marcelo Licona MD    ciprofloxacin 400 mg Intravenous Q12H Pricilla Pires MD Last Rate: 400 mg (09/28/18 6346)   lactated ringers 125 mL/hr Intravenous Continuous Ofelia Simmering, DO Last Rate: 125 mL/hr (09/28/18 7984)   metroNIDAZOLE 500 mg Intravenous Q8H Pricilla Pires MD Last Rate: 500 mg (09/28/18 5027)   morphine injection 2 mg Intravenous Q4H PRN Paulina Witt MD    ondansetron 4 mg Intravenous Once Ofelia Simmering, DO    ondansetron 4 mg Intravenous Q6H PRN Ofelia Carreon, DO    potassium chloride 40 mEq Oral Once Paulina Witt MD         Today, Patient Was Seen By: Paulina Witt MD    ** Please Note: Dictation voice to text software may have been used in the creation of this document   **

## 2018-09-28 NOTE — NURSING NOTE
Patient has complaints of mild abdominal pain and right shoulder pain  Nurse advised patient to ring when needing medication, patient aware of options available and is compliant to ring for same  Apple juice provided, tolerating well  No nausea, no vomiting  Positive bowel sounds in all 4 quadrants  Patient not passing flatus as of yet  LR infusing, patient understanding of same  SCDs at foot of bed, nurse educated patient on importance of same, patient refusing at present  Call bell in reach, will monitor

## 2018-09-28 NOTE — NURSING NOTE
AOX3 HR regular Lungs clear hypoactive Bowel Sounds x4 + PP ABD soft tender to touch  R shoulder with steri strip dry intact and sling in place  C/O pain Morphine was given and was effective  Will continue to monitor call bell within reach

## 2018-09-28 NOTE — NURSING NOTE
Upon rounds and administering IV antibiotic patient appears to be sleeping comfortably  Denies pain  No distress noted  No change in assessment  Call bell in reach, IV fluids/antibiotics infusing  Will monitor

## 2018-09-29 PROCEDURE — 99232 SBSQ HOSP IP/OBS MODERATE 35: CPT | Performed by: NURSE PRACTITIONER

## 2018-09-29 RX ADMIN — SODIUM CHLORIDE, SODIUM LACTATE, POTASSIUM CHLORIDE, AND CALCIUM CHLORIDE 125 ML/HR: 600; 310; 30; 20 INJECTION, SOLUTION INTRAVENOUS at 14:06

## 2018-09-29 RX ADMIN — METRONIDAZOLE 500 MG: 500 INJECTION, SOLUTION INTRAVENOUS at 16:47

## 2018-09-29 RX ADMIN — MORPHINE SULFATE 2 MG: 2 INJECTION, SOLUTION INTRAMUSCULAR; INTRAVENOUS at 16:50

## 2018-09-29 RX ADMIN — SODIUM CHLORIDE, SODIUM LACTATE, POTASSIUM CHLORIDE, AND CALCIUM CHLORIDE 125 ML/HR: 600; 310; 30; 20 INJECTION, SOLUTION INTRAVENOUS at 04:07

## 2018-09-29 RX ADMIN — MORPHINE SULFATE 2 MG: 2 INJECTION, SOLUTION INTRAMUSCULAR; INTRAVENOUS at 07:44

## 2018-09-29 RX ADMIN — ONDANSETRON 4 MG: 2 INJECTION, SOLUTION INTRAMUSCULAR; INTRAVENOUS at 17:15

## 2018-09-29 RX ADMIN — CIPROFLOXACIN 400 MG: 2 INJECTION, SOLUTION INTRAVENOUS at 07:41

## 2018-09-29 RX ADMIN — METRONIDAZOLE 500 MG: 500 INJECTION, SOLUTION INTRAVENOUS at 00:39

## 2018-09-29 RX ADMIN — CIPROFLOXACIN 400 MG: 2 INJECTION, SOLUTION INTRAVENOUS at 20:01

## 2018-09-29 RX ADMIN — METRONIDAZOLE 500 MG: 500 INJECTION, SOLUTION INTRAVENOUS at 09:34

## 2018-09-29 NOTE — PROGRESS NOTES
Abdominal pain improved  Slight LLQ tenderness  Imp:  Diverticulitis resolving  Plan:  Increase diet

## 2018-09-29 NOTE — PROGRESS NOTES
Progress Note - Mariangel Herrera 1975, 37 y o  male MRN: [id number]    Unit/Bed#: 7T [facility] 710-02 Encounter: [id number]    Primary Care Provider: Christin Mirza DO   Date and time admitted to hospital: 2018  9:04 PM        * Perforation of sigmoid colon due to diverticulitis   Assessment & Plan    · Patient with microperforation of the sigmoid colon due to diverticulitis  · Per surgery  · Diet increased       Moderate persistent asthma without complication   Assessment & Plan    · Currently stable with no respiratory distress or hypoxia  · Continue with nebulizers and oxygen if needed  · Continue current treatment  · Encourage out of bed and incentive spirometry       Small bowel obstruction ([facility] [address] )   Assessment & Plan    · Per surgery       VTE Pharmacologic Prophylaxis:   Pharmacologic: ambulate, low risk  Mechanical VTE Prophylaxis in Place: Yes    Patient Centered Rounds: I have performed bedside rounds with nursing staff today  Discussions with Specialists or Other Care Team Provider: Primary RN    Education and Discussions with Family / Patient: discussed with patient     Time Spent for Care: 20 minutes  More than 50% of total time spent on counseling and coordination of care as described above  Current Length of Stay: 7 day(s)    Current Patient Status: Inpatient   Certification Statement: The patient will continue to require additional inpatient hospital stay due to perforated sigmoid colon    Discharge Plan / Estimated Discharge Date:   Not medically stable for discharged      Code Status: Level 1 - Full Code      Subjective:   Feels well  Offers no complaints of chest pain or shortness of breath  No nausea vomiting diarrhea constipation  Abdomen is , he is not passing gas but is having bowel movements  No fever or chills      Objective:     Vitals:   Temp (24hrs), Av 1 °F (36 2 °C), Min:96 7 °F (35 9 °C), Max:97 8 °F (36 6 °C)    HR:  [69-78] 73  Resp:  [16-18] 18  BP: (115-128)/(68-84) 128/84  SpO2:  [97 %] 97 %  Body mass index is 23 25 kg/m²  Input and Output Summary (last 24 hours): Intake/Output Summary (Last 24 hours) at 09/29/18 1120  Last data filed at 09/29/18 0946   Gross per 24 hour   Intake             2860 ml   Output             2075 ml   Net              785 ml       Physical Exam:     Physical Exam   Constitutional: He is oriented to person, place, and time  He appears well-nourished  No distress  HENT:   Head: Normocephalic  Eyes: Pupils are equal, round, and reactive to light  Neck: Normal range of motion  Neck supple  Cardiovascular: Normal rate, regular rhythm and normal heart sounds  Pulmonary/Chest: Effort normal and breath sounds normal  No respiratory distress  Abdominal: Soft  There is no tenderness  Musculoskeletal: Normal range of motion  Neurological: He is alert and oriented to person, place, and time  Skin: Skin is warm and dry  Psychiatric: He has a normal mood and affect  His behavior is normal    Nursing note and vitals reviewed  Additional Data:     Labs:      Results from last 7 days  Lab Units 09/25/18  0505   WBC Thousand/uL 7 70   HEMOGLOBIN g/dL 12 0*   HEMATOCRIT % 35 5*   PLATELETS Thousands/uL 245   NEUTROS PCT % 69*   LYMPHS PCT % 20   MONOS PCT % 9   EOS PCT % 2       Results from last 7 days  Lab Units 09/27/18  0503  09/22/18  2133   SODIUM mmol/L 138  < > 135*   POTASSIUM mmol/L 3 5*  < > 4 0   CHLORIDE mmol/L 102  < > 96*   CO2 mmol/L 29  < > 25   BUN mg/dL 6  < > 20   CREATININE mg/dL 0 80  < > 1 17   CALCIUM mg/dL 8 5  < > 9 4   ALK PHOS U/L  --   --  84   ALT U/L  --   --  39   AST U/L  --   --  32   < > = values in this interval not displayed  Results from last 7 days  Lab Units 09/23/18  0837   INR  1 23*       * I Have Reviewed All Lab Data Listed Above        Recent Cultures (last 7 days):       Results from last 7 days  Lab Units 09/26/18  0440 09/25/18  0832   C DIFF TOXIN B  NEGATIVE for C difficle toxin by PCR  NEGATIVE for C difficle toxin by PCR  Last 24 Hours Medication List:     Current Facility-Administered Medications:  albuterol 2 puff Inhalation Q4H PRN Kathy Reyes MD    ciprofloxacin 400 mg Intravenous Q12H Pricilla Pires MD Last Rate: 400 mg (09/29/18 0741)   lactated ringers 125 mL/hr Intravenous Continuous Mitcheal Hair, DO Last Rate: 125 mL/hr (09/29/18 0946)   metroNIDAZOLE 500 mg Intravenous Q8H Billiam Pires MD Last Rate: 500 mg (09/29/18 0946)   morphine injection 2 mg Intravenous Q4H PRN Sandy Mitchell MD    ondansetron 4 mg Intravenous Once Mitcheal Hair, DO    ondansetron 4 mg Intravenous Q6H PRN Mitcheal Hair, DO         Today, Patient Was Seen By: PHILIPP Taveras    ** Please Note: Dragon 360 Dictation voice to text software may have been used in the creation of this document   **

## 2018-09-29 NOTE — NURSING NOTE
AOX3 HR regular Lungs clear + Bowel Sounds x4 + PP ABD soft tender to touch  R shoulder with steri strip dry intact sling on  C/O pain Morphine was given and was effective  Will continue to monitor call bell within reach

## 2018-09-29 NOTE — NURSING NOTE
Patient resting comfortably in bed  Upon assessing abdominal discomfort/pain, patient stated "It's good, it doesn't hurt only a bit of stiffness in my shoulder", recent right shoulder surgery done  Steri strips intact to same, sling off at this time per patient's decision  Refusing SCDs  IV fluids infusing  Call bell in reach, will monitor

## 2018-09-30 PROCEDURE — 94760 N-INVAS EAR/PLS OXIMETRY 1: CPT

## 2018-09-30 PROCEDURE — 99232 SBSQ HOSP IP/OBS MODERATE 35: CPT | Performed by: FAMILY MEDICINE

## 2018-09-30 PROCEDURE — 94640 AIRWAY INHALATION TREATMENT: CPT

## 2018-09-30 PROCEDURE — 94664 DEMO&/EVAL PT USE INHALER: CPT

## 2018-09-30 RX ORDER — CIPROFLOXACIN 250 MG/1
500 TABLET, FILM COATED ORAL EVERY 12 HOURS SCHEDULED
Status: DISCONTINUED | OUTPATIENT
Start: 2018-09-30 | End: 2018-10-01 | Stop reason: HOSPADM

## 2018-09-30 RX ORDER — METRONIDAZOLE 500 MG/1
500 TABLET ORAL EVERY 8 HOURS SCHEDULED
Status: DISCONTINUED | OUTPATIENT
Start: 2018-09-30 | End: 2018-10-01 | Stop reason: HOSPADM

## 2018-09-30 RX ORDER — SACCHAROMYCES BOULARDII 250 MG
250 CAPSULE ORAL 2 TIMES DAILY
Status: DISCONTINUED | OUTPATIENT
Start: 2018-09-30 | End: 2018-10-01 | Stop reason: HOSPADM

## 2018-09-30 RX ADMIN — ALBUTEROL SULFATE 2 PUFF: 90 AEROSOL, METERED RESPIRATORY (INHALATION) at 06:19

## 2018-09-30 RX ADMIN — Medication 250 MG: at 17:29

## 2018-09-30 RX ADMIN — ALBUTEROL SULFATE 2 PUFF: 90 AEROSOL, METERED RESPIRATORY (INHALATION) at 12:11

## 2018-09-30 RX ADMIN — METRONIDAZOLE 500 MG: 500 TABLET ORAL at 14:18

## 2018-09-30 RX ADMIN — METRONIDAZOLE 500 MG: 500 INJECTION, SOLUTION INTRAVENOUS at 10:15

## 2018-09-30 RX ADMIN — METRONIDAZOLE 500 MG: 500 TABLET ORAL at 21:07

## 2018-09-30 RX ADMIN — CIPROFLOXACIN HYDROCHLORIDE 500 MG: 250 TABLET, FILM COATED ORAL at 21:07

## 2018-09-30 RX ADMIN — Medication 250 MG: at 12:11

## 2018-09-30 RX ADMIN — SODIUM CHLORIDE, SODIUM LACTATE, POTASSIUM CHLORIDE, AND CALCIUM CHLORIDE 125 ML/HR: 600; 310; 30; 20 INJECTION, SOLUTION INTRAVENOUS at 01:05

## 2018-09-30 RX ADMIN — METRONIDAZOLE 500 MG: 500 INJECTION, SOLUTION INTRAVENOUS at 00:56

## 2018-09-30 RX ADMIN — ALBUTEROL SULFATE 2 PUFF: 90 AEROSOL, METERED RESPIRATORY (INHALATION) at 17:43

## 2018-09-30 RX ADMIN — CIPROFLOXACIN 400 MG: 2 INJECTION, SOLUTION INTRAVENOUS at 08:56

## 2018-09-30 RX ADMIN — CIPROFLOXACIN HYDROCHLORIDE 500 MG: 250 TABLET, FILM COATED ORAL at 12:11

## 2018-09-30 NOTE — NURSING NOTE
Patient awake, alert and oriented to PPT  Denies pain or shortness of breath at this time  States, "I feel good " HRR  Lungs CTA bilaterally  Abd soft with diffuse tenderness, denies N/V or diarrhea  No edema, pulses palpable  Bed in lowest position, call bell within reach

## 2018-09-30 NOTE — PROGRESS NOTES
Progress Note - Thang Bazan 1975, 37 y o  male MRN: [id number]    Unit/Bed#: 7T [facility] 710-02 Encounter: [id number]    Primary Care Provider: Fletcher Longoria DO   Date and time admitted to hospital: 9/22/2018  9:04 PM        * Perforation of sigmoid colon due to diverticulitis   Assessment & Plan    · Patient with microperforation of the sigmoid colon due to diverticulitis  · Discontinue IV fluids and IV antibiotics  Will place on oral course of Cipro and Flagyl for total of 10 days  Switch to oral antibiotics  He is tolerating his diet well  Expected to be discharged home tomorrow  Will need colonoscopy in 4-6 weeks   Abdominal pain   Assessment & Plan    Secondary to acute diverticulitis  Continue with IV analgesia but decrease IV morphine to 1 mg IV Q for p r n  Small bowel obstruction (HCC)   Assessment & Plan    · Now resolved     Moderate persistent asthma without complication   Assessment & Plan    · Currently stable with no respiratory distress or hypoxia  · Continue with nebulizers and oxygen if needed  · Continue current treatment  · Encourage out of bed and incentive spirometry         VTE Pharmacologic Prophylaxis:   Pharmacologic: Pharmacologic VTE Prophylaxis contraindicated due to abd pain  Mechanical VTE Prophylaxis in Place: Yes    Patient Centered Rounds: I have performed bedside rounds with nursing staff today  Discussions with Specialists or Other Care Team Provider: none    Education and Discussions with Family / Patient:   Discussed with patient at bedside about his hospital course  Time Spent for Care: 30 minutes  More than 50% of total time spent on counseling and coordination of care as described above      Current Length of Stay: 8 day(s)    Current Patient Status: Inpatient   Certification Statement: The patient will continue to require additional inpatient hospital stay due to abd pain    Discharge Plan: tomorrow to go home    Code Status: Level 1 - Full Code      Subjective:   Patient states that his abdominal pain is much improved  He is tolerating his soft diet  He will stay till tomorrow because he is still unsure whether he will be okay at home or not  No more nausea or vomiting noted  Objective:     Vitals:   Temp (24hrs), Av 6 °F (36 4 °C), Min:97 °F (36 1 °C), Max:98 1 °F (36 7 °C)    HR:  [60-82] 69  Resp:  [16-18] 16  BP: (109-133)/(73-85) 133/79  SpO2:  [97 %-100 %] 98 %  Body mass index is 23 25 kg/m²  Input and Output Summary (last 24 hours): Intake/Output Summary (Last 24 hours) at 18 1144  Last data filed at 18 1132   Gross per 24 hour   Intake             1320 ml   Output             3050 ml   Net            -1730 ml       Physical Exam:     Physical Exam   Constitutional: He is oriented to person, place, and time  He appears well-developed and well-nourished  HENT:   Head: Normocephalic and atraumatic  Right Ear: External ear normal    Left Ear: External ear normal    Mouth/Throat: Oropharynx is clear and moist    Eyes: Pupils are equal, round, and reactive to light  Conjunctivae and EOM are normal    Neck: Normal range of motion  Neck supple  Cardiovascular: Normal rate, regular rhythm, normal heart sounds and intact distal pulses  Pulmonary/Chest: Effort normal and breath sounds normal    Abdominal: Soft  Bowel sounds are normal  He exhibits no mass  There is no tenderness  There is no rebound and no guarding  Genitourinary:   Genitourinary Comments: deferred   Musculoskeletal: Normal range of motion  Neurological: He is alert and oriented to person, place, and time  He has normal reflexes  Skin: Skin is warm and dry  No rash noted  Psychiatric: He has a normal mood and affect  Nursing note and vitals reviewed          Additional Data:     Labs:      Results from last 7 days  Lab Units 18  0505   WBC Thousand/uL 7 70   HEMOGLOBIN g/dL 12 0*   HEMATOCRIT % 35 5*   PLATELETS Thousands/uL 245 NEUTROS PCT % 69*   LYMPHS PCT % 20   MONOS PCT % 9   EOS PCT % 2       Results from last 7 days  Lab Units 09/27/18  0503   SODIUM mmol/L 138   POTASSIUM mmol/L 3 5*   CHLORIDE mmol/L 102   CO2 mmol/L 29   BUN mg/dL 6   CREATININE mg/dL 0 80   CALCIUM mg/dL 8 5                     * I Have Reviewed All Lab Data Listed Above  * Additional Pertinent Lab Tests Reviewed: [address] Admission Reviewed    Imaging:    Imaging Reports Reviewed Today Include: none  Imaging Personally Reviewed by Myself Includes:  none    Recent Cultures (last 7 days):       Results from last 7 days  Lab Units 09/26/18  0440 09/25/18  0832   C DIFF TOXIN B  NEGATIVE for C difficle toxin by PCR  NEGATIVE for C difficle toxin by PCR  Last 24 Hours Medication List:     Current Facility-Administered Medications:  albuterol 2 puff Inhalation Q4H PRN Yuyl Luna MD   ciprofloxacin 500 mg Oral Q12H [address] Yuly Luna MD   metroNIDAZOLE 500 mg Oral [facility] Yuly Luna MD   morphine injection 1 mg Intravenous Q4H PRN Yuly Luna MD   ondansetron 4 mg Intravenous Q6H PRN Hailee Mac DO   saccharomyces boulardii 250 mg Oral BID Yuly Luna MD        Today, Patient Was Seen By: Yuly Luna MD    ** Please Note: Dictation voice to text software may have been used in the creation of this document   **

## 2018-09-30 NOTE — NURSING NOTE
Pt resting comfortable in bed  VSS  Offers no complaints  No new changes from previous assessment  Will continue to monitor  Call bell in reach

## 2018-09-30 NOTE — PROGRESS NOTES
Abdominal pain resolved  Tolerating solid food  Abd:  Soft and nontender  Imp:  Diverticulitis resolving    Plan:  Continue IV antibiotics for now,

## 2018-10-01 VITALS
HEIGHT: 69 IN | RESPIRATION RATE: 20 BRPM | HEART RATE: 60 BPM | DIASTOLIC BLOOD PRESSURE: 73 MMHG | TEMPERATURE: 97.5 F | OXYGEN SATURATION: 95 % | SYSTOLIC BLOOD PRESSURE: 122 MMHG | BODY MASS INDEX: 23.31 KG/M2 | WEIGHT: 157.41 LBS

## 2018-10-01 PROCEDURE — 99233 SBSQ HOSP IP/OBS HIGH 50: CPT | Performed by: INTERNAL MEDICINE

## 2018-10-01 PROCEDURE — 94640 AIRWAY INHALATION TREATMENT: CPT

## 2018-10-01 PROCEDURE — 94760 N-INVAS EAR/PLS OXIMETRY 1: CPT

## 2018-10-01 RX ADMIN — Medication 250 MG: at 08:11

## 2018-10-01 RX ADMIN — CIPROFLOXACIN HYDROCHLORIDE 500 MG: 250 TABLET, FILM COATED ORAL at 08:11

## 2018-10-01 RX ADMIN — METRONIDAZOLE 500 MG: 500 TABLET ORAL at 05:40

## 2018-10-01 RX ADMIN — ALBUTEROL SULFATE 2 PUFF: 90 AEROSOL, METERED RESPIRATORY (INHALATION) at 14:50

## 2018-10-01 RX ADMIN — ALBUTEROL SULFATE 2 PUFF: 90 AEROSOL, METERED RESPIRATORY (INHALATION) at 07:25

## 2018-10-01 NOTE — DISCHARGE INSTRUCTIONS
Take one Augmentin 875mg every 12 hours with food  See Dr Taylor Cullen this week  Call [phone] for a time  [address] [facility]

## 2018-10-01 NOTE — NURSING NOTE
Patient discharged  IV removed  Belongings accounted for  Inhaler returned to patient  Discharge instructions explained to patient, prescriptions given  Patient ambulated out with nursing staff

## 2018-10-01 NOTE — DISCHARGE SUMMARY
Discharge Summary - Vaishnavi Hernandez 37 y o  male MRN: [id number]    Unit/Bed#: 5T -01 Encounter: [id number]    Admission Date:   Admission Orders     Ordered        09/22/18 2338  Inpatient Admission (expected length of stay for this patient is greater than two midnights)  Once               Admitting Diagnosis: Small bowel obstruction ([address] ) [K56 609]  Abdominal pain [R10 9]  Moderate persistent asthma without complication [W76 25]  Perforation of sigmoid colon due to diverticulitis [K57 20]    HPI: 37  y o f with LLQ abdominal pain  CT scan in ER showed acuted sigmoid diverticulitis with perforation  Ileus small bowel  Symptoms improved and resolved with IV antibiotics  Discharged to home in good condition  Procedures Performed:   Orders Placed This Encounter   Procedures    ED ECG Documentation Only       Summary of Hospital Course: Acute sigmoid diverticultis with microperforation resolved with IV antibiotics  Significant Findings, Care, Treatment and Services Provided: IV antibiotics, NPO, IVF  Hydration  Complications: None    Discharge Diagnosis: Acute sigmoid diverticulitis with perforation                                     Asthma    Resolved Problems  Date Reviewed: 10/1/2018    None          Condition at Discharge: good         Discharge instructions/Information to patient and family:   See after visit summary for information provided to patient and family  Provisions for Follow-Up Care:  See after visit summary for information related to follow-up care and any pertinent home health orders  PCP: Francisco Galindo DO    Disposition: Home    Planned Readmission: No    Discharge Statement   I spent 10 minutes discharging the patient  This time was spent on the day of discharge  I had direct contact with the patient on the day of discharge  Additional documentation is required if more than 30 minutes were spent on discharge       Discharge Medications:  See after visit summary for reconciled discharge medications provided to patient and family

## 2018-10-01 NOTE — CASE MANAGEMENT
Continued Stay Review    Date: 09/30/18    Vital Signs: /73 (BP Location: Left arm)   Pulse 61   Temp 97 5 °F (36 4 °C) (Temporal)   Resp 20   Ht 5' 9" (1 753 m)   Wt 71 4 kg (157 lb 6 5 oz)   SpO2 93%   BMI 23 25 kg/m²     Medications:   Scheduled Meds:   Current Facility-Administered Medications:  albuterol 2 puff Inhalation Q4H PRN Destinee Bolanos MD   ciprofloxacin 500 mg Oral Q12H [address] Destinee Bolanos MD   metroNIDAZOLE 500 mg Oral [facility] Destinee Bolanos MD   morphine injection 1 mg Intravenous Q4H PRN Destinee Bolanos MD   ondansetron 4 mg Intravenous Q6H PRN Epi Aranda DO   saccharomyces boulardii 250 mg Oral BID Destinee Bolanos MD     PRN Meds:   albuterol    morphine injection    ondansetron    Age/Sex: 37 y o  male     Assessment/Plan:    Tolerating diet  Switching to oral abx  Morphine IV prn pain --none taken since 9/29    Discharge Plan: home when medically stable, tentative d/c 10/1

## 2018-10-01 NOTE — NURSING NOTE
AOX3 HR regular Lungs clear + Bowel Sounds x4 + PP ABD soft  Denies any pain  Will continue to monitor call bell within reach

## 2018-10-01 NOTE — PROGRESS NOTES
Progress Note - Emily Barrientosr 1975, 37 y o  male MRN: [id number]    Unit/Bed#: [facility] 510-01 Encounter: [id number]    Primary Care Provider: Giacomo Sullivan DO   Date and time admitted to hospital: 2018  9:04 PM  1  Sigmoid diverticulitis/perforated sigmoid diverticulitis  Some clinical improvement reported  Patient is able to tolerate p o  Diet  Ambulating well and currently has no complaints  Continue p o  Cipro and Flagyl x9 more days  Medicine team will sign off at this point  However will closely monitor patient's chart  Please call us if any further need is needed  2    Small-bowel obstruction  Improving currently resolved  3   History of moderate persistent asthma  No acute exacerbation  Discussed asthma action plan  Continue current care    4 hypokalemia   Will give 40 mEq of IV potassium chloride  This is likely due to decreased p o  Intake      VTE Pharmacologic Prophylaxis:   Pharmacologic: Pharmacologic VTE Prophylaxis contraindicated due to Due to acute diverticulitis may need surgery  Mechanical VTE Prophylaxis in Place: Yes    Patient Centered Rounds: I have performed bedside rounds with nursing staff today  Discussions with Specialists or Other Care Team Provider:   No    Education and Discussions with Family / Patient:   With patient    Time Spent for Care:   28   More than 50% of total time spent on counseling and coordination of care as described above  Current Length of Stay: 9 day(s)    Current Patient Status: Inpatient   Certification Statement: The patient will continue to require additional inpatient hospital stay due to For medical management    Discharge Plan:   Next 2-3 days    Code Status: Level 1 - Full Code      Subjective:   I am doing real well since you visited last time       Objective:     Vitals:   Temp (24hrs), Av 6 °F (36 4 °C), Min:97 3 °F (36 3 °C), Max:98 1 °F (36 7 °C)    HR:  [61-75] 61  Resp:  [16-20] 20  BP: (120-127)/(69-86) 122/73  SpO2: [93 %-99 %] 93 %  Body mass index is 23 25 kg/m²  Input and Output Summary (last 24 hours): Intake/Output Summary (Last 24 hours) at 10/01/18 1119  Last data filed at 09/30/18 1700   Gross per 24 hour   Intake             1645 ml   Output              100 ml   Net             1545 ml       Physical Exam:     Physical Exam   Constitutional: He is oriented to person, place, and time  He appears well-developed and well-nourished  HENT:   Head: Normocephalic and atraumatic  Right Ear: External ear normal    Left Ear: External ear normal    Mouth/Throat: Oropharynx is clear and moist    Eyes: Pupils are equal, round, and reactive to light  Conjunctivae and EOM are normal    Neck: Normal range of motion  Neck supple  Cardiovascular: Normal rate, regular rhythm, normal heart sounds and intact distal pulses  Pulmonary/Chest: Effort normal and breath sounds normal    Abdominal: Soft  Bowel sounds are normal  He exhibits distension  He exhibits no mass  There is tenderness  There is no rebound and no guarding  Bowel sound positive but diminished decrease tenderness and guarding decreased distension   Genitourinary:   Genitourinary Comments: deferred   Musculoskeletal: Normal range of motion  Neurological: He is alert and oriented to person, place, and time  He has normal reflexes  Skin: Skin is warm and dry  No rash noted  Psychiatric: He has a normal mood and affect  Nursing note and vitals reviewed          Additional Data:     Labs:      Results from last 7 days  Lab Units 09/25/18  0505   WBC Thousand/uL 7 70   HEMOGLOBIN g/dL 12 0*   HEMATOCRIT % 35 5*   PLATELETS Thousands/uL 245   NEUTROS PCT % 69*   LYMPHS PCT % 20   MONOS PCT % 9   EOS PCT % 2       Results from last 7 days  Lab Units 09/27/18  0503   SODIUM mmol/L 138   POTASSIUM mmol/L 3 5*   CHLORIDE mmol/L 102   CO2 mmol/L 29   BUN mg/dL 6   CREATININE mg/dL 0 80   CALCIUM mg/dL 8 5                     * I Have Reviewed All Lab Data Listed Above  * Additional Pertinent Lab Tests Reviewed: [address] Admission Reviewed    Imaging:    Imaging Reports Reviewed Today Include:   None today  Imaging Personally Reviewed by Myself Includes:  Reviewed    Recent Cultures (last 7 days):       Results from last 7 days  Lab Units 09/26/18  0440 09/25/18  0832   C DIFF TOXIN B  NEGATIVE for C difficle toxin by PCR  NEGATIVE for C difficle toxin by PCR  Last 24 Hours Medication List:     Current Facility-Administered Medications:  albuterol 2 puff Inhalation Q4H PRN Lyndsey Stein MD   ciprofloxacin 500 mg Oral Q12H [address] Lyndsey Stein MD   metroNIDAZOLE 500 mg Oral [facility] Lyndsey Stein MD   morphine injection 1 mg Intravenous Q4H PRN Lyndsey Stein MD   ondansetron 4 mg Intravenous Q6H PRN Milady Lazo DO   saccharomyces boulardii 250 mg Oral BID Lyndsey Stein MD        Today, Patient Was Seen By: Ida Dodd    ** Please Note: Dictation voice to text software may have been used in the creation of this document   **

## 2018-10-02 ENCOUNTER — TRANSITIONAL CARE MANAGEMENT (OUTPATIENT)
Dept: FAMILY MEDICINE CLINIC | Facility: CLINIC | Age: 43
End: 2018-10-02

## 2018-10-02 ENCOUNTER — TELEPHONE (OUTPATIENT)
Dept: FAMILY MEDICINE CLINIC | Facility: CLINIC | Age: 43
End: 2018-10-02

## 2018-10-25 DIAGNOSIS — J45.20 ASTHMA IN ADULT, MILD INTERMITTENT, UNCOMPLICATED: ICD-10-CM

## 2018-10-25 RX ORDER — ALBUTEROL SULFATE 90 UG/1
AEROSOL, METERED RESPIRATORY (INHALATION)
Qty: 8.5 INHALER | Refills: 0 | Status: SHIPPED | OUTPATIENT
Start: 2018-10-25 | End: 2020-06-22 | Stop reason: SDUPTHER

## 2018-10-28 DIAGNOSIS — J45.20 ASTHMA IN ADULT, MILD INTERMITTENT, UNCOMPLICATED: ICD-10-CM

## 2019-06-04 NOTE — UTILIZATION REVIEW
URGENT/EMERGENT  INPATIENT/SPU AUTHORIZATION REQUEST    Date: 06/04/19            # Pages in this Request:     x New Request   Additional Information for PA#:     Office Contact Name:  Charissa Curry Title: Utilization Review, Registed Nurse     Phone: [phone]  Ext  Availability (Date/Time): Wednesday - Friday 8 am- 4 pm    x Inpatient Review  SPU Review        Current       x Late Pick-up   · How your facility was first notified of the Late Pick-up:  Paths Letter   · When your facility was first notified of the Late Pick-up (date): 5/21/2019         RECIPIENT INFORMATION    Recipient ID#: [id number]   Recipient Name: Parviz Gomez   YOB: 1975  37 y o  Recipient Alias:     Gender:  x Male  Female Medicaid Eligibility ([address] [place] [address]): INSURANCE INFORMATION    (All other private or governmental health insurance benefits must be utilized prior to billing the [place] Program)    Was this admission the result of an MVA, other accident, assault, injury, fall, gunshot, bite etc ? Yes x No                   If yes, provide a brief description of the incident  Does the recipient have other insurance coverage? Yes x No        Insurance Company Name/Policy #      Did that insurance pay on this claim? Yes  No        Did that insurance deny this claim? Yes  No    If yes, reason for denial:      Does the recipient have Medicare? Yes x No        Did Medicare exhaust prior to this admission? Yes  No            Did Medicare partially pay this claim? Yes  No        Did that insurance deny this claim? Yes  No    If yes, reason for denial:          Was the recipient a prisoner at the time of admission?    Yes x No            PROVIDER INFORMATION    [facility]: [facility] ([facility]) [address] Provider ID#: [id number]    Admitting Physician Name: Al Cole Provider ID#: [id number]        ADMISSION INFORMATION    Type of Admission: (please choose one)    x ED Direct    If yes, from where? Transfer    If yes, transferring hospital (inpatient, rehab, or psych) Provider Name/Provider ID#: Admission Floor or Unit Type:  Med Surg   Dates/Times:        ED Date/Time: 9/22/2018  20:48        Observation Date/Time:         Admission Date/Time: 9/22/18 2338        Discharge or Transfer Date/Time: 10/1/2018  3:00 PM        DIAGNOSIS/PROCEDURE CODES    Primary Diagnosis Code/Primary Diagnosis Code description:   K57 20 Diverticulitis of large intestine with perforation and abscess without bleeding   K56 699 Other intestinal obstruction unspecified as to partial versus complete obstruction  J45 40 Moderate persistent asthma, uncomplicated   A35 6 Hypokalemia  Additional Diagnosis Code(s) and Description(s)-(up to three additional codes):     Procedure Code (one) and description:         CLINICAL INFORMATION - PRIOR ADMISSION ONLY    Is there a prior admission with a discharge date within 30 days of the date of this admission?     x No (Proceed to the next section - "Clinical Information - General Review Checklist:)      Yes (Provide the following information)     Prior admission dates:    MA Prior Authorization Number:        Review Outcome:     Diagnosis Code(s)/Description:    Procedure Code/Description:    Findings:    Treatment:    Condition on Discharge:   Vitals:    Labs:   Imaging:   Medications: Follow-up Instructions:    Disposition:        CLINICAL INFORMATION - GENERAL REVIEW CHECKLIST    EMERGENCY DEPARTMENT: (Proceed to "ADMISSION" if Direct Admission)    Presenting Signs/Symptoms: 49-year-old male  C/o abd pain     Started about 2 Day ago    No relieving factors       currently severe, associated with some nausea and vomiting      somewhat constipated - attributes to Vicodin that he has been taking for a shoulder  Pain, dislocated        Medication/treatment prior to arrival in the ED:     Past Medical History:    Active Ambulatory Problems     Diagnosis Date Noted    Moderate persistent asthma without complication 14/13/1499     Past Medical History:   Diagnosis Date    Asthma     Shoulder dislocation        Clinical Exam:     Initial Vital Signs: (Temp, Pulse, Resp, and BP)   ED Triage Vitals [09/22/18 2106]   Temperature Pulse Respirations Blood Pressure SpO2   98 3 °F (36 8 °C) (!) 109 18 (!) 173/89 94 %      Temp Source Heart Rate Source Patient Position - Orthostatic VS BP Location FiO2 (%)   Temporal Monitor Lying Left arm --      Pain Score       Worst Possible Pain           Pertinent Repeat Vital Signs: (include times they were obtained)  09/23/18 0115    97 °F (36 1 °C)   93   18   131/87   95 %   None (Room air)   Lying   09/23/18 0044   --   98   16   140/76   94 %   None (Room air)   Lying   09/22/18 2255   --   97   19   148/83   97 %   None (Room air)   Lying   09/22/18 2106   98 3 °F (36 8 °C)    109   18    173/89   94 %   None (Room air)   Lying       Pertinent Sustained Findings: (include times they were obtained)    Weight in Kilograms:  Wt Readings from Last 1 Encounters:   09/23/18 71 4 kg (157 lb 6 5 oz)       Pertinent Labs (results):  Na  135   136  WBC  14 70    13 10   Hgb  15 0   11 9  hct  45 3   35 8  UA  sg 1 010,  2+ ketones  Radiology (results):  CTAP -  sigmoid diverticulitis with findings of microperforation   There is resultant inflammation of an adjacent loop of small bowel within the lower pelvis, causing a functional small bowel obstruction and upstream dilatation of small bowel up to 3 1 cm      EKG (results):      Other tests (results):    Tests pending final results:    Treatment in the ED:   Medication Administration from 09/22/2018 2048 to 09/23/2018 0106       Date/Time Order Dose Route Action     09/22/2018 2134 sodium chloride 0 9 % infusion 125 mL/hr Intravenous New Bag     09/22/2018 2134 morphine (PF) 10 mg/mL injection 6 mg 6 mg Intravenous Given     09/22/2018 2138 ondansetron (ZOFRAN) injection 4 mg 4 mg Intravenous Given     09/22/2018 2240 iohexol (OMNIPAQUE) 350 MG/ML injection (SINGLE-DOSE) 100 mL 100 mL Intravenous Given     09/23/2018 0011 midazolam (VERSED) injection 3 mg 3 mg Intravenous Given         Other treatments:       Change in condition while in the ED:       Response to ED Treatment:           OBSERVATION: (Proceed to "ADMISSION" if Direct Admission)    Orders written during the observation period  Meds Name, dose, route, time, how may doses given:  PRN Meds Name, dose, route, time, how many doses given within the first 24 hrs :  IVs Type, rate, and total amt  ordered/given:  Labs, imaging, other:  Consults and findings:    Test Results during the observation period  Pertinent Lab tests (dates/results):  Culture results (blood, urine, spinal, wound, respiratory, etc ):  Imaging tests (dates/results):  EKG (dates/results):   Other test (dates/results):  Tests pending (dates/results):    Surgical or Invasive Procedures during the observation period  Name of surgery/procedure:  Date & Time:  Patient Response:  Post-operative orders:  Operative Report/Findings:    Response to Treatment, Major Change in Condition, Major Charge in Treatment during the observation period          ADMISSION:    DIRECT Admissions Only:    · Presenting Signs/Symptoms:   ·   · Medication/treatment prior to arrival:  ·   · Past Medical History:  ·   · Clinical Exam on admission:  ·   · Vital Signs on admission: (Temp, Pulse, Resp, and BP)  ·   · Weight in kilograms:     ALL Admissions:  Assessment/Plan        Abdominal pain   Assessment & Plan     Secondary to acute diverticulitis  Continue with IV analgesia       Small bowel obstruction ([address] )   Assessment & Plan     Secondary to acute diverticulitis  NG-tube to lo suction       * Perforation of sigmoid colon due to diverticulitis   Assessment & Plan     Patient with microperforation of the sigmoid colon due to diverticulitis  Continue to monitor closely for worsening signs and symptoms of sepsis             Admission Orders:    Admission Orders and Other Orders written within the first 24 hrs after admission  Admit med surg  NPO - NG tube to lo intermittent suction  Sequential compression device to b/l LE  Consult internal medicine  IVF @ 125      Meds Name, dose, route, time, how may doses given:  IV MS @ 0549  And  0926  IV NS  Changed to LR @ 125 cc/hr  this AM    Flagyl IV q 8  Cipro IV q 12  PRN Meds Name, dose, route, time, how many doses given within the first 24 hrs :  IVs Type, rate, and total amt  ordered/given:  Labs, imaging, other:      Consults and findings:    Test Results after admission  Pertinent Lab tests (dates/results):  Culture results (blood, urine, spinal, wound, respiratory, etc ):  Imaging tests (dates/results):  EKG (dates/results): Other test (dates/results):  Tests pending (dates/results):    Surgical or Invasive Procedures  Name of surgery/procedure:  Date & Time:  Patient Response:  Post-operative orders:  Operative Report/Findings:    Response to Treatment, Major Change in Condition, Major Charge in Treatment anytime during admission  37  y o f with LLQ abdominal pain  CT scan in ER showed acuted sigmoid diverticulitis with perforation  Ileus small bowel  Symptoms improved and resolved with IV antibiotics  Discharged to home in good condition      Disposition on Discharge  Home, Rehab, SNF, LTC, Shelter, etc : Home/Self Care    Cease to Breathe (CTB)  If a patient expires during an admission, in addition to the above information, please include:    Summary/timeline of the patient's decline in condition:    Medications and treatment:    Patient response to treatment:    Date and time patient ceased to breathe:        Is there a Readmission that follows this admission?    Yes x No    If yes, reason for denial:          InterQual Review    InterQual Criteria Met: x Yes  No  N/A        Please include the InterQual Review, InterQual year/version used, and the criteria selected:   Created Using Review Status Review Entered   InterQual® In Primary 6/4/2019 15:35       Criteria Set Name - Subset   LOC:Acute Adult-General Medical       Criteria Review   REVIEW SUMMARY     Patient: [address]  Review Number: [id number]  Review Status: In Primary  Criteria Status: Acute Met  Day Met: Episode Day 1     Condition Specific: Yes        OUTCOMES  Outcome Type: Primary           REVIEW DETAILS     Product: [place] Adult  Subset: General Medical      (Symptom or finding within 24h)         (Excludes PO medications unless noted)          [X] Select Day, One:              [X] Episode Day 1, One:                  [X] ACUTE, >= One:                      [X] Gastrointestinal or biliary, One:                          [X] Other gastrointestinal diagnosis, actual or suspected, >= One:                              [X] Bowel obstruction, All:                                  [X] Bowel obstruction                                  [X] Confirmed by imaging                                  [X] NPO or nasogastric tube to suction                                  [X] IV fluid, One:                                      [X] >= 100 mL/h and weight >= 60 kg     Version: InterQual® 2018  2  InterQual® and InterQual®  © 2018 [facility] and/or one of its [place]  All Rights Reserved  CPT only © 2017 American [employer] Association  All Rights Reserved  PLEASE SUBMIT THE COMPLETED FORM TO THE DEPARTMENT OF HUMAN SERVICES - DIVISION OF CLINICAL  REVIEW VIA FAX [phone] or VIA E-MAIL TO [email or web address]    Signature: Ghassan Barraza Date:  06/04/19    Confidentiality Notice: The documents accompanying this telecopy may contain confidential information belonging to the sender  The information is intended only for the use of the individual named above   If you are not the intended recipient, you are hereby notified  That any disclosure, copying, distribution or taking of any telecopy is strictly prohibited

## 2020-06-22 ENCOUNTER — OFFICE VISIT (OUTPATIENT)
Dept: FAMILY MEDICINE CLINIC | Facility: CLINIC | Age: 45
End: 2020-06-22
Payer: COMMERCIAL

## 2020-06-22 VITALS
DIASTOLIC BLOOD PRESSURE: 80 MMHG | BODY MASS INDEX: 25.77 KG/M2 | HEIGHT: 69 IN | WEIGHT: 174 LBS | SYSTOLIC BLOOD PRESSURE: 120 MMHG | TEMPERATURE: 98.6 F

## 2020-06-22 DIAGNOSIS — J45.40 MODERATE PERSISTENT ASTHMA WITHOUT COMPLICATION: ICD-10-CM

## 2020-06-22 DIAGNOSIS — R68.82 DECREASED LIBIDO: ICD-10-CM

## 2020-06-22 DIAGNOSIS — J45.40 MODERATE PERSISTENT ASTHMA WITHOUT COMPLICATION: Primary | ICD-10-CM

## 2020-06-22 DIAGNOSIS — Z13.1 SCREENING FOR DIABETES MELLITUS: ICD-10-CM

## 2020-06-22 DIAGNOSIS — J45.20 ASTHMA IN ADULT, MILD INTERMITTENT, UNCOMPLICATED: ICD-10-CM

## 2020-06-22 DIAGNOSIS — Z13.220 SCREENING, LIPID: ICD-10-CM

## 2020-06-22 DIAGNOSIS — F52.4 PREMATURE EJACULATION: ICD-10-CM

## 2020-06-22 PROBLEM — K56.609 SMALL BOWEL OBSTRUCTION (HCC): Status: RESOLVED | Noted: 2018-09-23 | Resolved: 2020-06-22

## 2020-06-22 PROBLEM — R10.9 ABDOMINAL PAIN: Status: RESOLVED | Noted: 2018-09-23 | Resolved: 2020-06-22

## 2020-06-22 PROBLEM — K57.20 PERFORATION OF SIGMOID COLON DUE TO DIVERTICULITIS: Status: RESOLVED | Noted: 2018-09-23 | Resolved: 2020-06-22

## 2020-06-22 PROCEDURE — 99214 OFFICE O/P EST MOD 30 MIN: CPT | Performed by: FAMILY MEDICINE

## 2020-06-22 PROCEDURE — 3008F BODY MASS INDEX DOCD: CPT | Performed by: FAMILY MEDICINE

## 2020-06-22 PROCEDURE — 1036F TOBACCO NON-USER: CPT | Performed by: FAMILY MEDICINE

## 2020-06-22 PROCEDURE — 3008F BODY MASS INDEX DOCD: CPT | Performed by: UROLOGY

## 2020-06-22 RX ORDER — ALBUTEROL SULFATE 90 UG/1
AEROSOL, METERED RESPIRATORY (INHALATION)
Qty: 8.5 INHALER | Refills: 0 | Status: SHIPPED | OUTPATIENT
Start: 2020-06-22

## 2020-06-23 ENCOUNTER — TELEPHONE (OUTPATIENT)
Dept: FAMILY MEDICINE CLINIC | Facility: CLINIC | Age: 45
End: 2020-06-23

## 2020-06-23 DIAGNOSIS — J45.40 MODERATE PERSISTENT ASTHMA WITHOUT COMPLICATION: Primary | ICD-10-CM

## 2020-06-23 RX ORDER — ALBUTEROL SULFATE 2.5 MG/3ML
2.5 SOLUTION RESPIRATORY (INHALATION) EVERY 6 HOURS PRN
Qty: 30 VIAL | Refills: 3 | Status: SHIPPED | OUTPATIENT
Start: 2020-06-23

## 2020-07-13 ENCOUNTER — TELEMEDICINE (OUTPATIENT)
Dept: UROLOGY | Facility: MEDICAL CENTER | Age: 45
End: 2020-07-13
Payer: COMMERCIAL

## 2020-07-13 DIAGNOSIS — F52.4 PREMATURE EJACULATION: Primary | ICD-10-CM

## 2020-07-13 DIAGNOSIS — R68.82 DECREASED LIBIDO: ICD-10-CM

## 2020-07-13 PROCEDURE — 99203 OFFICE O/P NEW LOW 30 MIN: CPT | Performed by: UROLOGY

## 2020-07-13 RX ORDER — PAROXETINE HYDROCHLORIDE 40 MG/1
40 TABLET, FILM COATED ORAL AS NEEDED
Qty: 15 TABLET | Refills: 1 | Status: SHIPPED | OUTPATIENT
Start: 2020-07-13 | End: 2020-11-10

## 2020-07-13 NOTE — PROGRESS NOTES
Virtual Regular Visit      Assessment/Plan:  1 -premature ejaculation-plan trial of Paxil 40 mg as needed 3-4 hours prior to intercourse  Side effects reviewed as per PVR-not given as daily medication  Patient aware of off-label usage  2   Decreased libido-normal testosterone level -more than likely secondary to problems with premature ejaculation  3   Prostate cancer screening-Afro-American male 53years of age-plan PSA FRANCIS  Problem List Items Addressed This Visit        Other    Premature ejaculation - Primary    Relevant Medications    PARoxetine (PAXIL) 40 MG tablet    Other Relevant Orders    PSA Total, Diagnostic    Decreased libido    Relevant Medications    PARoxetine (PAXIL) 40 MG tablet               Reason for visit is No chief complaint on file  Encounter provider Hilda Brantley MD    Provider located at [address] [place] [phone]      Recent Visits  No visits were found meeting these conditions  Showing recent visits within past 7 days and meeting all other requirements     Future Appointments  No visits were found meeting these conditions  Showing future appointments within next 150 days and meeting all other requirements        The patient was identified by name and date of birth  Dalton Alvarez was informed that this is a telemedicine visit and that the visit is being conducted through [employer] and patient was informed that this is not a secure, HIPAA-complaint platform  He agrees to proceed     My office door was closed  No one else was in the room  He acknowledged consent and understanding of privacy and security of the video platform  The patient has agreed to participate and understands they can discontinue the visit at any time  Patient is aware this is a billable service       Subjective-history of present illness  Dalton Alvarez is a 53 y o  male with a multiple year history of premature ejaculation  The patient notes that he is able to get an erection and penetrate for normal intercourse however after a few thrust he will reach climax  This is disturbing to him in his decreased his libido  He saw his PCP ordered a testosterone panel which was normal   The patient presents for evaluation of premature ejaculation  He is also on Afro-American male in his 5th decade and we discussed his higher risk of prostate cancer and the need for FRANCIS and PSA  This will be arranged  [place] HPI     Past Medical History:   Diagnosis Date    Asthma     Shoulder dislocation        Past Surgical History:   Procedure Laterality Date    SHOULDER ARTHROSCOPY      SHOULDER SURGERY         Current Outpatient Medications   Medication Sig Dispense Refill    albuterol (2 5 mg/3 mL) 0 083 % nebulizer solution Take 1 vial (2 5 mg total) by nebulization every 6 (six) hours as needed for wheezing or shortness of breath 30 vial 3    albuterol (ProAir HFA) 90 mcg/act inhaler 2 puffs every 6 hours as needed for cough and shortness of breath 8 5 Inhaler 0    fluticasone-salmeterol (Advair Diskus) 100-50 mcg/dose inhaler Inhale 1 puff 2 (two) times a day Rinse mouth after use  1 Inhaler 5    PARoxetine (PAXIL) 40 MG tablet Take 1 tablet (40 mg total) by mouth as needed (Premature ejaculation) 15 tablet 1     No current facility-administered medications for this visit  No Known Allergies    Review of Systems   All other systems reviewed and are negative  Video Exam    There were no vitals filed for this visit  Physical Exam   Constitutional: He is oriented to person, place, and time  He appears well-developed and well-nourished  No distress  HENT:   Head: Normocephalic and atraumatic  Eyes: EOM are normal    Neck: Normal range of motion  Pulmonary/Chest: Effort normal  No respiratory distress  Neurological: He is alert and oriented to person, place, and time     Psychiatric: He has a normal mood and affect  His behavior is normal  Judgment and thought content normal         I spent 25 minutes directly with the patient during this visit      VIRTUAL VISIT DISCLAIMER    Annemarie Rothman acknowledges that he has consented to an online visit or consultation  He understands that the online visit is based solely on information provided by him, and that, in the absence of a face-to-face physical evaluation by the physician, the diagnosis he receives is both limited and provisional in terms of accuracy and completeness  This is not intended to replace a full medical face-to-face evaluation by the physician  Annemarie Rothman understands and accepts these terms

## 2020-07-21 ENCOUNTER — TELEPHONE (OUTPATIENT)
Dept: FAMILY MEDICINE CLINIC | Facility: CLINIC | Age: 45
End: 2020-07-21

## 2020-07-21 DIAGNOSIS — J45.40 MODERATE PERSISTENT ASTHMA WITHOUT COMPLICATION: Primary | ICD-10-CM

## 2020-07-21 RX ORDER — MONTELUKAST SODIUM 10 MG/1
10 TABLET ORAL
Qty: 30 TABLET | Refills: 3 | Status: SHIPPED | OUTPATIENT
Start: 2020-07-21

## 2020-07-21 NOTE — TELEPHONE ENCOUNTER
He needs to check with insurance to see what the cheaper substitution is he has to be on maintenance medication to treat his asthma

## 2020-07-21 NOTE — TELEPHONE ENCOUNTER
Our next step for him is to increase his advair to 250/50 1 puff twice daily and Singulair 10 mg daily   I will send in the script

## 2020-11-09 DIAGNOSIS — F52.4 PREMATURE EJACULATION: ICD-10-CM

## 2020-11-10 RX ORDER — PAROXETINE HYDROCHLORIDE 40 MG/1
40 TABLET, FILM COATED ORAL AS NEEDED
Qty: 15 TABLET | Refills: 1 | Status: SHIPPED | OUTPATIENT
Start: 2020-11-10 | End: 2020-11-25

## 2020-11-24 DIAGNOSIS — F52.4 PREMATURE EJACULATION: ICD-10-CM

## 2020-11-25 RX ORDER — PAROXETINE HYDROCHLORIDE 40 MG/1
TABLET, FILM COATED ORAL
Qty: 90 TABLET | Refills: 1 | Status: SHIPPED | OUTPATIENT
Start: 2020-11-25

## 2021-04-29 ENCOUNTER — NURSE TRIAGE (OUTPATIENT)
Dept: OTHER | Facility: OTHER | Age: 46
End: 2021-04-29

## 2021-04-29 DIAGNOSIS — Z20.828 EXPOSURE TO SARS VIRUS: Primary | ICD-10-CM

## 2021-04-29 DIAGNOSIS — Z20.828 EXPOSURE TO SARS VIRUS: ICD-10-CM

## 2021-04-29 PROCEDURE — U0005 INFEC AGEN DETEC AMPLI PROBE: HCPCS | Performed by: FAMILY MEDICINE

## 2021-04-29 PROCEDURE — U0003 INFECTIOUS AGENT DETECTION BY NUCLEIC ACID (DNA OR RNA); SEVERE ACUTE RESPIRATORY SYNDROME CORONAVIRUS 2 (SARS-COV-2) (CORONAVIRUS DISEASE [COVID-19]), AMPLIFIED PROBE TECHNIQUE, MAKING USE OF HIGH THROUGHPUT TECHNOLOGIES AS DESCRIBED BY CMS-2020-01-R: HCPCS | Performed by: FAMILY MEDICINE

## 2021-04-29 NOTE — TELEPHONE ENCOUNTER
1  Were you within 6 feet or less, for up to 15 minutes or more with a person that has a confirmed COVID-19 test? His mother in law tested positive  2  What was the date of your exposure? He lives with her  3  Are you experiencing any symptoms attributed to the virus?  (Assess for SOB, cough, fever, difficulty breathing) mild fatigue  4   HIGH RISK: Do you have any history heart or lung conditions, weakened immune system, diabetes, Asthma, CHF, HIV, COPD, Chemo, renal failure, sickle cell, etc? Asthma

## 2021-04-29 NOTE — TELEPHONE ENCOUNTER
Regarding: COVID-19 Exposed 6 of 6  ----- Message from Kelvin Botello sent at 4/29/2021  1:52 PM EDT -----  "All six of us have been exposed and will need to be COVID-19 tested "

## 2021-04-30 LAB — SARS-COV-2 RNA RESP QL NAA+PROBE: NEGATIVE

## 2021-09-12 ENCOUNTER — APPOINTMENT (EMERGENCY)
Dept: RADIOLOGY | Facility: HOSPITAL | Age: 46
End: 2021-09-12

## 2021-09-12 ENCOUNTER — HOSPITAL ENCOUNTER (EMERGENCY)
Facility: HOSPITAL | Age: 46
Discharge: HOME | End: 2021-09-12
Attending: EMERGENCY MEDICINE | Admitting: EMERGENCY MEDICINE

## 2021-09-12 VITALS
TEMPERATURE: 97.5 F | RESPIRATION RATE: 16 BRPM | BODY MASS INDEX: 25.91 KG/M2 | DIASTOLIC BLOOD PRESSURE: 89 MMHG | OXYGEN SATURATION: 96 % | WEIGHT: 175.49 LBS | SYSTOLIC BLOOD PRESSURE: 159 MMHG | HEART RATE: 105 BPM

## 2021-09-12 DIAGNOSIS — S92.355A CLOSED NONDISPLACED FRACTURE OF FIFTH METATARSAL BONE OF LEFT FOOT, INITIAL ENCOUNTER: Primary | ICD-10-CM

## 2021-09-12 PROCEDURE — 99283 EMERGENCY DEPT VISIT LOW MDM: CPT

## 2021-09-12 PROCEDURE — 73630 X-RAY EXAM OF FOOT: CPT

## 2021-09-12 PROCEDURE — 73610 X-RAY EXAM OF ANKLE: CPT

## 2021-09-12 PROCEDURE — 29515 APPLICATION SHORT LEG SPLINT: CPT | Performed by: PHYSICIAN ASSISTANT

## 2021-09-12 PROCEDURE — 99284 EMERGENCY DEPT VISIT MOD MDM: CPT | Performed by: PHYSICIAN ASSISTANT

## 2021-09-12 RX ORDER — IBUPROFEN 600 MG/1
600 TABLET ORAL EVERY 6 HOURS PRN
Qty: 30 TABLET | Refills: 0 | Status: SHIPPED | OUTPATIENT
Start: 2021-09-12

## 2021-09-12 RX ORDER — IBUPROFEN 600 MG/1
600 TABLET ORAL ONCE
Status: COMPLETED | OUTPATIENT
Start: 2021-09-12 | End: 2021-09-12

## 2021-09-12 RX ADMIN — IBUPROFEN 600 MG: 600 TABLET, FILM COATED ORAL at 19:26
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
## 2023-05-02 ENCOUNTER — HOSPITAL ENCOUNTER (EMERGENCY)
Facility: HOSPITAL | Age: 48
Discharge: HOME | End: 2023-05-02
Attending: EMERGENCY MEDICINE

## 2023-05-02 ENCOUNTER — APPOINTMENT (EMERGENCY)
Dept: CT IMAGING | Facility: HOSPITAL | Age: 48
End: 2023-05-02

## 2023-05-02 ENCOUNTER — APPOINTMENT (EMERGENCY)
Dept: RADIOLOGY | Facility: HOSPITAL | Age: 48
End: 2023-05-02

## 2023-05-02 VITALS
OXYGEN SATURATION: 100 % | HEART RATE: 83 BPM | TEMPERATURE: 98.2 F | WEIGHT: 173.72 LBS | SYSTOLIC BLOOD PRESSURE: 137 MMHG | DIASTOLIC BLOOD PRESSURE: 79 MMHG | RESPIRATION RATE: 18 BRPM | BODY MASS INDEX: 25.65 KG/M2
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
## 2023-05-02 DIAGNOSIS — S39.012A LOW BACK STRAIN, INITIAL ENCOUNTER: ICD-10-CM

## 2023-05-02 DIAGNOSIS — S16.1XXA STRAIN OF NECK MUSCLE, INITIAL ENCOUNTER: ICD-10-CM

## 2023-05-02 RX ORDER — METHOCARBAMOL 500 MG/1
500 TABLET, FILM COATED ORAL 2 TIMES DAILY
Qty: 20 TABLET | Refills: 0 | Status: SHIPPED | OUTPATIENT
Start: 2023-05-02

## 2023-05-02 RX ORDER — KETOROLAC TROMETHAMINE 30 MG/ML
15 INJECTION, SOLUTION INTRAMUSCULAR; INTRAVENOUS ONCE
Status: COMPLETED | OUTPATIENT
Start: 2023-05-02 | End: 2023-05-02

## 2023-05-02 RX ORDER — ACETAMINOPHEN 325 MG/1
650 TABLET ORAL ONCE
Status: COMPLETED | OUTPATIENT
Start: 2023-05-02 | End: 2023-05-02

## 2023-05-02 RX ORDER — LIDOCAINE 50 MG/G
1 PATCH TOPICAL DAILY
Qty: 10 PATCH | Refills: 0 | Status: SHIPPED | OUTPATIENT
Start: 2023-05-02

## 2023-05-02 RX ORDER — METHOCARBAMOL 500 MG/1
500 TABLET, FILM COATED ORAL ONCE
Status: COMPLETED | OUTPATIENT
Start: 2023-05-02 | End: 2023-05-02

## 2023-05-02 RX ORDER — LIDOCAINE 50 MG/G
2 PATCH TOPICAL ONCE
Status: DISCONTINUED | OUTPATIENT
Start: 2023-05-02 | End: 2023-05-02 | Stop reason: HOSPADM

## 2023-05-02 RX ORDER — NAPROXEN 500 MG/1
500 TABLET ORAL 2 TIMES DAILY WITH MEALS
Qty: 20 TABLET | Refills: 0 | Status: SHIPPED | OUTPATIENT
Start: 2023-05-02

## 2023-05-02 RX ADMIN — KETOROLAC TROMETHAMINE 15 MG: 30 INJECTION, SOLUTION INTRAMUSCULAR; INTRAVENOUS at 10:25

## 2023-05-02 RX ADMIN — ACETAMINOPHEN 325MG 650 MG: 325 TABLET ORAL at 10:24

## 2023-05-02 RX ADMIN — LIDOCAINE 2 PATCH: 700 PATCH TOPICAL at 10:28

## 2023-05-02 RX ADMIN — METHOCARBAMOL 500 MG: 500 TABLET ORAL at 10:24
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
## 2024-01-07 NOTE — ASSESSMENT & PLAN NOTE
Currently stable with no respiratory distress or hypoxia  Continue with nebulizers and oxygen if needed
Currently stable with no respiratory distress or hypoxia  Continue with nebulizers and oxygen if needed  Lungs clear continue to watch
Currently stable with no respiratory distress or hypoxia  Continue with nebulizers and oxygen if needed  Lungs clear continue to watch
Currently stable with no respiratory distress or hypoxia  Continue with nebulizers and oxygen if needed  Lungs clear continue to watch  No shortness of breath no wheezing  Continue incentive spirometry
Currently stable with no respiratory distress or hypoxia  Continue with nebulizers and oxygen if needed  Lungs clear continue to watch  No shortness of breath no wheezing  Continue incentive spirometry
Patient with microperforation of the sigmoid colon due to diverticulitis  Admitted under surgery service  Patient with significant abdominal pain and tenderness  Continue to monitor closely for worsening signs and symptoms of sepsis
Patient with microperforation of the sigmoid colon due to diverticulitis  Admitted under surgery service  Patient with significant abdominal pain and tenderness  Continue to monitor closely for worsening signs and symptoms of sepsis  Acute diverticulitis few micro perforation with adjacent is more bowel inflamed  Management per surgeon  Continue antibiotic  DC NG tube I  Did move his bowels
Patient with microperforation of the sigmoid colon due to diverticulitis  Admitted under surgery service  Patient with significant abdominal pain and tenderness  Continue to monitor closely for worsening signs and symptoms of sepsis  Acute diverticulitis few micro perforation with adjacent is more bowel inflamed  Management per surgeon  Continue antibiotic pain is improving  Move the bowel  Still on ice chips
Patient with microperforation of the sigmoid colon due to diverticulitis  Admitted under surgery service  Patient with significant abdominal pain and tenderness  Continue to monitor closely for worsening signs and symptoms of sepsis  Acute diverticulitis few micro perforation with adjacent is more bowel inflamed  Management per surgeon  Continue antibiotic pain is improving  Move the bowel patient is started clear liquid  Continue clear liquid  Pain is much controlled decreased pain medication consider increasing it diet to soft diet
Patient with microperforation of the sigmoid colon due to diverticulitis  Admitted under surgery service  Patient with significant abdominal pain and tenderness  Continue to monitor closely for worsening signs and symptoms of sepsis  Acute diverticulitis few micro perforation with adjacent is more bowel inflamed  Management per surgeon  Continue antibiotic pain is improving  Move the bowel patient is started clear liquid  Tolerating okay  Had loose stool
Secondary to acute diverticulitis  Care as per primary team-surgery  NG-tube attempted  Maintain IV fluid started clear liquid tolerating well positive bowel sounds  Obstructive series noted most probably ileus secondary to perforated microperforation diverticulitis with adjacent small-bowel inflamed  To loose stool having diarrhea order C diff
Secondary to acute diverticulitis  Care as per primary team-surgery  NG-tube attempted  Maintain NPO status
Secondary to acute diverticulitis  Care as per primary team-surgery  NG-tube attempted  Maintain NPO status  Obstructive series noted most probably ileus secondary to perforated microperforation diverticulitis with adjacent small-bowel inflamed
Secondary to acute diverticulitis  Continue with IV analgesia
Secondary to acute diverticulitis  Continue with IV analgesia  Pain management per surgeon
Secondary to acute diverticulitis  Continue with IV analgesia  Pain management per surgeon
Secondary to acute diverticulitis  Continue with IV analgesia  Pain management per surgeon  Improving secondary to acute diverticulitis
Secondary to acute diverticulitis  Continue with IV analgesia  Pain management per surgeon  Improving secondary to acute diverticulitis  Decreased pain medication
Secondary to acute diverticulitis  Continue with IV analgesia but decrease IV morphine to 1 mg IV Q for p r n 
· Currently stable with no respiratory distress or hypoxia  · Continue with nebulizers and oxygen if needed  · Continue current treatment  · Encourage out of bed and incentive spirometry
· Currently stable with no respiratory distress or hypoxia  · Continue with nebulizers and oxygen if needed  · Continue current treatment  · Encourage out of bed and incentive spirometry
· Now resolved
· Patient with microperforation of the sigmoid colon due to diverticulitis  · Discontinue IV fluids and IV antibiotics  Will place on oral course of Cipro and Flagyl for total of 10 days  Switch to oral antibiotics  He is tolerating his diet well  Expected to be discharged home tomorrow  Will need colonoscopy in 4-6 weeks  
· Patient with microperforation of the sigmoid colon due to diverticulitis  · Per surgery  · Diet increased
· Per surgery
4

## 2025-07-18 ENCOUNTER — HOSPITAL ENCOUNTER (EMERGENCY)
Facility: HOSPITAL | Age: 50
Discharge: HOME | End: 2025-07-18
Attending: EMERGENCY MEDICINE
Payer: COMMERCIAL

## 2025-07-18 ENCOUNTER — APPOINTMENT (EMERGENCY)
Dept: CT IMAGING | Facility: HOSPITAL | Age: 50
End: 2025-07-18
Payer: COMMERCIAL

## 2025-07-18 VITALS
SYSTOLIC BLOOD PRESSURE: 132 MMHG | OXYGEN SATURATION: 93 % | WEIGHT: 153.3 LBS | HEART RATE: 77 BPM | BODY MASS INDEX: 22.64 KG/M2 | DIASTOLIC BLOOD PRESSURE: 89 MMHG | TEMPERATURE: 99.6 F | RESPIRATION RATE: 18 BRPM

## 2025-07-18 DIAGNOSIS — N30.80 ABSCESS OF BLADDER: ICD-10-CM

## 2025-07-18 DIAGNOSIS — N30.90 CYSTITIS: Primary | ICD-10-CM

## 2025-07-18 DIAGNOSIS — E87.6 HYPOKALEMIA: ICD-10-CM

## 2025-07-18 DIAGNOSIS — R10.9 ABDOMINAL PAIN: ICD-10-CM

## 2025-07-18 LAB
ALBUMIN SERPL BCG-MCNC: 4.2 G/DL (ref 3.5–5)
ALP SERPL-CCNC: 93 U/L (ref 34–104)
ALT SERPL W P-5'-P-CCNC: 37 U/L (ref 7–52)
ANION GAP SERPL CALCULATED.3IONS-SCNC: 12 MMOL/L (ref 4–13)
AST SERPL W P-5'-P-CCNC: 17 U/L (ref 13–39)
BACTERIA UR QL AUTO: ABNORMAL /HPF
BASOPHILS # BLD AUTO: 0.03 THOUSANDS/ÂΜL (ref 0–0.1)
BASOPHILS NFR BLD AUTO: 0 % (ref 0–1)
BILIRUB SERPL-MCNC: 0.66 MG/DL (ref 0.2–1)
BILIRUB UR QL STRIP: NEGATIVE
BUN SERPL-MCNC: 11 MG/DL (ref 5–25)
CALCIUM SERPL-MCNC: 8.9 MG/DL (ref 8.4–10.2)
CHLORIDE SERPL-SCNC: 97 MMOL/L (ref 96–108)
CLARITY UR: ABNORMAL
CO2 SERPL-SCNC: 28 MMOL/L (ref 21–32)
COLOR UR: ABNORMAL
CREAT SERPL-MCNC: 0.93 MG/DL (ref 0.6–1.3)
EOSINOPHIL # BLD AUTO: 0.06 THOUSAND/ÂΜL (ref 0–0.61)
EOSINOPHIL NFR BLD AUTO: 1 % (ref 0–6)
ERYTHROCYTE [DISTWIDTH] IN BLOOD BY AUTOMATED COUNT: 14.3 % (ref 11.6–15.1)
GFR SERPL CREATININE-BSD FRML MDRD: 94 ML/MIN/1.73SQ M
GLUCOSE SERPL-MCNC: 90 MG/DL (ref 65–140)
GLUCOSE UR STRIP-MCNC: NEGATIVE MG/DL
HCT VFR BLD AUTO: 40.8 % (ref 36.5–49.3)
HGB BLD-MCNC: 13.4 G/DL (ref 12–17)
HGB UR QL STRIP.AUTO: 250
IMM GRANULOCYTES # BLD AUTO: 0.03 THOUSAND/UL (ref 0–0.2)
IMM GRANULOCYTES NFR BLD AUTO: 0 % (ref 0–2)
KETONES UR STRIP-MCNC: ABNORMAL MG/DL
LEUKOCYTE ESTERASE UR QL STRIP: 500
LIPASE SERPL-CCNC: 8 U/L (ref 11–82)
LYMPHOCYTES # BLD AUTO: 1.49 THOUSANDS/ÂΜL (ref 0.6–4.47)
LYMPHOCYTES NFR BLD AUTO: 15 % (ref 14–44)
MAGNESIUM SERPL-MCNC: 1.9 MG/DL (ref 1.9–2.7)
MCH RBC QN AUTO: 29.3 PG (ref 26.8–34.3)
MCHC RBC AUTO-ENTMCNC: 32.8 G/DL (ref 31.4–37.4)
MCV RBC AUTO: 89 FL (ref 82–98)
MONOCYTES # BLD AUTO: 0.85 THOUSAND/ÂΜL (ref 0.17–1.22)
MONOCYTES NFR BLD AUTO: 9 % (ref 4–12)
NEUTROPHILS # BLD AUTO: 7.28 THOUSANDS/ÂΜL (ref 1.85–7.62)
NEUTS SEG NFR BLD AUTO: 75 % (ref 43–75)
NITRITE UR QL STRIP: NEGATIVE
NON-SQ EPI CELLS URNS QL MICRO: ABNORMAL /HPF
NRBC BLD AUTO-RTO: 0 /100 WBCS
PH UR STRIP.AUTO: 6 [PH]
PLATELET # BLD AUTO: 304 THOUSANDS/UL (ref 149–390)
PMV BLD AUTO: 9.5 FL (ref 8.9–12.7)
POTASSIUM SERPL-SCNC: 2.9 MMOL/L (ref 3.5–5.3)
PROT SERPL-MCNC: 8.5 G/DL (ref 6.4–8.4)
PROT UR STRIP-MCNC: >=500 MG/DL
RBC # BLD AUTO: 4.58 MILLION/UL (ref 3.88–5.62)
RBC #/AREA URNS AUTO: ABNORMAL /HPF
SODIUM SERPL-SCNC: 137 MMOL/L (ref 135–147)
SP GR UR STRIP.AUTO: 1.01 (ref 1–1.04)
UROBILINOGEN UA: 4 MG/DL
WBC # BLD AUTO: 9.74 THOUSAND/UL (ref 4.31–10.16)
WBC #/AREA URNS AUTO: ABNORMAL /HPF

## 2025-07-18 PROCEDURE — 81003 URINALYSIS AUTO W/O SCOPE: CPT

## 2025-07-18 PROCEDURE — 96367 TX/PROPH/DG ADDL SEQ IV INF: CPT

## 2025-07-18 PROCEDURE — 85025 COMPLETE CBC W/AUTO DIFF WBC: CPT

## 2025-07-18 PROCEDURE — 81001 URINALYSIS AUTO W/SCOPE: CPT

## 2025-07-18 PROCEDURE — 87086 URINE CULTURE/COLONY COUNT: CPT

## 2025-07-18 PROCEDURE — 74177 CT ABD & PELVIS W/CONTRAST: CPT

## 2025-07-18 PROCEDURE — 96366 THER/PROPH/DIAG IV INF ADDON: CPT

## 2025-07-18 PROCEDURE — 83690 ASSAY OF LIPASE: CPT

## 2025-07-18 PROCEDURE — 96365 THER/PROPH/DIAG IV INF INIT: CPT

## 2025-07-18 PROCEDURE — 87186 SC STD MICRODIL/AGAR DIL: CPT

## 2025-07-18 PROCEDURE — 87077 CULTURE AEROBIC IDENTIFY: CPT

## 2025-07-18 PROCEDURE — 80053 COMPREHEN METABOLIC PANEL: CPT

## 2025-07-18 PROCEDURE — NC001 PR NO CHARGE

## 2025-07-18 PROCEDURE — 83735 ASSAY OF MAGNESIUM: CPT

## 2025-07-18 PROCEDURE — 99284 EMERGENCY DEPT VISIT MOD MDM: CPT

## 2025-07-18 PROCEDURE — 96375 TX/PRO/DX INJ NEW DRUG ADDON: CPT

## 2025-07-18 PROCEDURE — 99285 EMERGENCY DEPT VISIT HI MDM: CPT

## 2025-07-18 RX ORDER — HYDROMORPHONE HCL/PF 1 MG/ML
1 SYRINGE (ML) INJECTION ONCE AS NEEDED
Status: DISCONTINUED | OUTPATIENT
Start: 2025-07-18 | End: 2025-07-19 | Stop reason: HOSPADM

## 2025-07-18 RX ORDER — SULFAMETHOXAZOLE AND TRIMETHOPRIM 800; 160 MG/1; MG/1
1 TABLET ORAL ONCE
Status: DISCONTINUED | OUTPATIENT
Start: 2025-07-18 | End: 2025-07-18

## 2025-07-18 RX ORDER — POTASSIUM CHLORIDE 14.9 MG/ML
20 INJECTION INTRAVENOUS ONCE
Status: COMPLETED | OUTPATIENT
Start: 2025-07-18 | End: 2025-07-18

## 2025-07-18 RX ORDER — SULFAMETHOXAZOLE AND TRIMETHOPRIM 800; 160 MG/1; MG/1
1 TABLET ORAL 2 TIMES DAILY
Qty: 20 TABLET | Refills: 0 | Status: SHIPPED | OUTPATIENT
Start: 2025-07-18 | End: 2025-07-19

## 2025-07-18 RX ORDER — SULFAMETHOXAZOLE AND TRIMETHOPRIM 800; 160 MG/1; MG/1
2 TABLET ORAL ONCE
Status: COMPLETED | OUTPATIENT
Start: 2025-07-18 | End: 2025-07-18

## 2025-07-18 RX ORDER — ONDANSETRON 2 MG/ML
4 INJECTION INTRAMUSCULAR; INTRAVENOUS ONCE
Status: COMPLETED | OUTPATIENT
Start: 2025-07-18 | End: 2025-07-18

## 2025-07-18 RX ORDER — KETOROLAC TROMETHAMINE 30 MG/ML
15 INJECTION, SOLUTION INTRAMUSCULAR; INTRAVENOUS ONCE AS NEEDED
Status: COMPLETED | OUTPATIENT
Start: 2025-07-18 | End: 2025-07-18

## 2025-07-18 RX ORDER — ACETAMINOPHEN 10 MG/ML
1000 INJECTION, SOLUTION INTRAVENOUS ONCE
Status: COMPLETED | OUTPATIENT
Start: 2025-07-18 | End: 2025-07-18

## 2025-07-18 RX ORDER — HYDROMORPHONE HCL/PF 1 MG/ML
1 SYRINGE (ML) INJECTION ONCE
Status: COMPLETED | OUTPATIENT
Start: 2025-07-18 | End: 2025-07-18

## 2025-07-18 RX ORDER — CEFTRIAXONE 1 G/50ML
1000 INJECTION, SOLUTION INTRAVENOUS ONCE
Status: COMPLETED | OUTPATIENT
Start: 2025-07-18 | End: 2025-07-18

## 2025-07-18 RX ADMIN — KETOROLAC TROMETHAMINE 15 MG: 30 INJECTION, SOLUTION INTRAMUSCULAR; INTRAVENOUS at 22:27

## 2025-07-18 RX ADMIN — SULFAMETHOXAZOLE AND TRIMETHOPRIM 2 TABLET: 800; 160 TABLET ORAL at 21:45

## 2025-07-18 RX ADMIN — SODIUM CHLORIDE 1000 ML: 0.9 INJECTION, SOLUTION INTRAVENOUS at 18:42

## 2025-07-18 RX ADMIN — SODIUM CHLORIDE 1000 ML: 0.9 INJECTION, SOLUTION INTRAVENOUS at 19:50

## 2025-07-18 RX ADMIN — POTASSIUM CHLORIDE 20 MEQ: 14.9 INJECTION, SOLUTION INTRAVENOUS at 19:50

## 2025-07-18 RX ADMIN — CEFTRIAXONE 1000 MG: 1 INJECTION, SOLUTION INTRAVENOUS at 21:46

## 2025-07-18 RX ADMIN — ONDANSETRON 4 MG: 2 INJECTION INTRAMUSCULAR; INTRAVENOUS at 18:43

## 2025-07-18 RX ADMIN — ACETAMINOPHEN 1000 MG: 10 INJECTION, SOLUTION INTRAVENOUS at 18:45

## 2025-07-18 RX ADMIN — IOHEXOL 100 ML: 350 INJECTION, SOLUTION INTRAVENOUS at 19:06

## 2025-07-18 RX ADMIN — HYDROMORPHONE HYDROCHLORIDE 1 MG: 1 INJECTION, SOLUTION INTRAMUSCULAR; INTRAVENOUS; SUBCUTANEOUS at 18:44

## 2025-07-18 NOTE — ED PROVIDER NOTES
Time reflects when diagnosis was documented in both MDM as applicable and the Disposition within this note       Time User Action Codes Description Comment    7/18/2025  9:29 PM Luis Alberto Okeefe [N30.90] Cystitis     7/18/2025  9:29 PM Luis Alberto Okeefe [N30.80] Abscess of bladder     7/18/2025  9:31 PM Luis Alberto Okeefe [E87.6] Hypokalemia     7/18/2025  9:31 PM Luis Alberto Okeefe [R10.9] Abdominal pain           ED Disposition       ED Disposition   Discharge    Condition   Stable    Date/Time   Fri Jul 18, 2025  9:29 PM    Comment   Christiano Moody discharge to home/self care.                   Assessment & Plan       Medical Decision Making  Patient is a 49-year-old male coming in for evaluation of abdominal pain that started yesterday but got worse today.  Was in significant mount of distress upon initial evaluation.  Patient was found to have a cystitis on CAT scan as well as a small bladder abscess.  Discussed with urology, who recommended outpatient therapy, with follow-up next week by urology.  Patient will receive dose of Rocephin here, as well as Bactrim, before being transition tomorrow to 10 days of Bactrim being picked up.  Patient also does some have some hypokalemia, did recommend the patient keep eating, at this point does not require further oral potassium pills.  Patient reports understanding, and has no further questions.  Patient's lab work otherwise is generally within normal limits, no sign of endorgan damage, no CVA tenderness, no concern for pyelocurrently.  Patient is nontoxic, is safe for discharge.    Amount and/or Complexity of Data Reviewed  Labs: ordered. Decision-making details documented in ED Course.  Radiology: ordered. Decision-making details documented in ED Course.    Risk  Prescription drug management.        ED Course as of 07/18/25 2139 Fri Jul 18, 2025 1849 Ketones, UA(!): 50 (2+)   1849 POCT URINE PROTEIN(!): >=500   1849 Leukocytes, UA(!): 500.0   1849  Blood, UA(!): 250.0   2115 CT abdomen pelvis with contrast    Marked urinary bladder wall thickening with perivesical edema/inflammation consistent with cystitis. 2.5 cm slightly thick-walled fluid collection along the bladder dome abutting the sigmoid colon, suspicious for abscess. Although the origin could be   from the sigmoid colon (diverticulitis), the center of the inflammation is not clearly surrounding the sigmoid colon itself. No air within the bladder to suggest colovesical fistula. Urologic consultation recommended.     Additional incidental findings as above.     2128 Spoke to Maria L Sommers PA-C, urology, who states that patient will need outpatient follow-up, but can be discharged on oral antibiotics, and treated as a complicated UTI.  Will give first dose of Rocepin, and transition over to Bactrim with follow-up to urology       Medications   potassium chloride 20 mEq IVPB (premix) (20 mEq Intravenous New Bag 7/18/25 1950)   HYDROmorphone (DILAUDID) injection 1 mg (has no administration in time range)   ketorolac (TORADOL) injection 15 mg (has no administration in time range)   cefTRIAXone (ROCEPHIN) IVPB (premix in dextrose) 1,000 mg 50 mL (has no administration in time range)   sulfamethoxazole-trimethoprim (BACTRIM DS) 800-160 mg per tablet 2 tablet (has no administration in time range)   acetaminophen (Ofirmev) injection 1,000 mg (0 mg Intravenous Stopped 7/18/25 1940)   HYDROmorphone (DILAUDID) injection 1 mg (1 mg Intravenous Given 7/18/25 1844)   ondansetron (ZOFRAN) injection 4 mg (4 mg Intravenous Given 7/18/25 1843)   sodium chloride 0.9 % bolus 1,000 mL (0 mL Intravenous Stopped 7/18/25 1941)   iohexol (OMNIPAQUE) 350 MG/ML injection (MULTI-DOSE) 100 mL (100 mL Intravenous Given 7/18/25 1906)   sodium chloride 0.9 % bolus 1,000 mL (1,000 mL Intravenous New Bag 7/18/25 1950)       ED Risk Strat Scores                    No data recorded                            History of Present  Illness       Chief Complaint   Patient presents with    Abdominal Pain     Reports mid lower abd pain shooting into his private area for about a day. Denies n/v/d. Denies fevers. Denies swelling of penis or testicles. Denies burning with urination but reports pressure. Denies penile discharge. Reports he works overnight and thought maybe he might have pulled something but he didn't do anything strenuous        Past Medical History[1]   Past Surgical History[2]   Family History[3]   Social History[4]   E-Cigarette/Vaping    E-Cigarette Use Never User       E-Cigarette/Vaping Substances      I have reviewed and agree with the history as documented.     Patient is a 49-year-old male coming in for evaluation of lower abdominal pain, slight dysuria, increased frequency.  Denies any concern for STDs at this time, no discharge, no fevers.  Has not take anything for pain at this time.  Reports that this all started yesterday, but has gotten worse today.  Did take some Motrin yesterday, with minimal relief.  Reports that he is concerned for prostatitis, after performing a Google search.  Reports he has pain with defecation as well.  No history of kidney stones.  Patient appears uncomfortable at this time      Abdominal Pain  Associated symptoms: no chest pain, no chills, no dysuria, no fatigue, no fever, no hematuria, no nausea and no vomiting        Review of Systems   Constitutional:  Negative for chills, fatigue and fever.   Cardiovascular:  Negative for chest pain.   Gastrointestinal:  Positive for abdominal pain and rectal pain. Negative for blood in stool, nausea and vomiting.   Genitourinary:  Negative for dysuria and hematuria.           Objective       ED Triage Vitals   Temperature Pulse Blood Pressure Respirations SpO2 Patient Position - Orthostatic VS   07/18/25 1814 07/18/25 1814 07/18/25 1814 07/18/25 1814 07/18/25 1814 07/18/25 1952   99.6 °F (37.6 °C) (!) 116 164/95 20 95 % Sitting      Temp Source Heart  Rate Source BP Location FiO2 (%) Pain Score    07/18/25 1814 07/18/25 1814 07/18/25 1952 -- 07/18/25 1844    Oral Monitor Left arm  8      Vitals      Date and Time Temp Pulse SpO2 Resp BP Pain Score FACES Pain Rating User   07/18/25 1952 -- 81 93 % 18 128/82 -- -- [place]   07/18/25 1844 -- -- -- -- -- 8 -- CB   07/18/25 1814 99.6 °F (37.6 °C) 116 95 % 20 164/95 -- --             Physical Exam  Vitals reviewed.   Constitutional:       Appearance: Normal appearance. He is normal weight.   HENT:      Head: Normocephalic and atraumatic.      Right Ear: External ear normal.      Left Ear: External ear normal.      Nose: Nose normal.     Eyes:      Conjunctiva/sclera: Conjunctivae normal.       Cardiovascular:      Rate and Rhythm: Normal rate.   Pulmonary:      Effort: Pulmonary effort is normal.   Abdominal:      Palpations: Abdomen is soft.      Tenderness: There is abdominal tenderness in the suprapubic area and left lower quadrant. There is no right CVA tenderness or left CVA tenderness.     Musculoskeletal:         General: Normal range of motion.      Cervical back: Normal range of motion.     Skin:     General: Skin is warm and dry.     Neurological:      Mental Status: He is alert.         Results Reviewed       Procedure Component Value Units Date/Time    Comprehensive metabolic panel [[id number]]  (Abnormal) Collected: 07/18/25 1839    Lab Status: Final result Specimen: Blood from Arm, Left Updated: 07/18/25 1900     Sodium 137 mmol/L      Potassium 2.9 mmol/L      Chloride 97 mmol/L      CO2 28 mmol/L      ANION GAP 12 mmol/L      BUN 11 mg/dL      Creatinine 0.93 mg/dL      Glucose 90 mg/dL      Calcium 8.9 mg/dL      AST 17 U/L      ALT 37 U/L      Alkaline Phosphatase 93 U/L      Total Protein 8.5 g/dL      Albumin 4.2 g/dL      Total Bilirubin 0.66 mg/dL      eGFR 94 ml/min/1.73sq m     Narrative:      [employer] guidelines for Chronic Kidney Disease (CKD):     Stage 1 with normal or  high GFR (GFR > 90 mL/min/1.73 square meters)    Stage 2 Mild CKD (GFR = 60-89 mL/min/1.73 square meters)    Stage 3A Moderate CKD (GFR = 45-59 mL/min/1.73 square meters)    Stage 3B Moderate CKD (GFR = 30-44 mL/min/1.73 square meters)    Stage 4 Severe CKD (GFR = 15-29 mL/min/1.73 square meters)    Stage 5 End Stage CKD (GFR <15 mL/min/1.73 square meters)  Note: GFR calculation is accurate only with a steady state creatinine    Magnesium [[id number]]  (Normal) Collected: 07/18/25 1839    Lab Status: Final result Specimen: Blood from Arm, Left Updated: 07/18/25 1900     Magnesium 1.9 mg/dL     Lipase [[id number]]  (Abnormal) Collected: 07/18/25 1839    Lab Status: Final result Specimen: Blood from Arm, Left Updated: 07/18/25 1900     Lipase 8 u/L     Urine Microscopic [[id number]]  (Abnormal) Collected: 07/18/25 1840    Lab Status: Final result Specimen: Urine, Other Updated: 07/18/25 1855     RBC, UA Innumerable /hpf      WBC, UA Innumerable /hpf      Epithelial Cells Occasional /hpf      Bacteria, UA       Field obscured, unable to enumerate     /hpf    Urine culture [[id number]] Collected: 07/18/25 1840    Lab Status: In process Specimen: Urine, Other Updated: 07/18/25 1855    UA w Reflex to Microscopic w Reflex to Culture [[id number]]  (Abnormal) Collected: 07/18/25 1840    Lab Status: Final result Specimen: Urine, Other Updated: 07/18/25 1847     Color, UA Brown     Clarity, UA Cloudy     Specific Gravity, UA 1.015     pH, UA 6.0     Leukocytes, .0     Nitrite, UA Negative     Protein, UA >=500 mg/dl      Glucose, UA Negative mg/dl      Ketones, UA 50 (2+) mg/dl      Bilirubin, UA Negative     Occult Blood, .0     UROBILINOGEN UA 4.0 mg/dL     CBC and differential [[id number]] Collected: 07/18/25 1839    Lab Status: Final result Specimen: Blood from Arm, Left Updated: 07/18/25 1845     WBC 9.74 Thousand/uL      RBC 4.58 Million/uL      Hemoglobin 13.4 g/dL      Hematocrit 40.8 %      MCV 89 fL      MCH  29.3 pg      MCHC 32.8 g/dL      RDW 14.3 %      MPV 9.5 fL      Platelets 304 Thousands/uL      nRBC 0 /100 WBCs      Segmented % 75 %      Immature Grans % 0 %      Lymphocytes % 15 %      Monocytes % 9 %      Eosinophils Relative 1 %      Basophils Relative 0 %      Absolute Neutrophils 7.28 Thousands/µL      Absolute Immature Grans 0.03 Thousand/uL      Absolute Lymphocytes 1.49 Thousands/µL      Absolute Monocytes 0.85 Thousand/µL      Eosinophils Absolute 0.06 Thousand/µL      Basophils Absolute 0.03 Thousands/µL             CT abdomen pelvis with contrast   Final Interpretation by Juan Carlos Burgess DO (07/18 2112)      Marked urinary bladder wall thickening with perivesical edema/inflammation consistent with cystitis. 2.5 cm slightly thick-walled fluid collection along the bladder dome abutting the sigmoid colon, suspicious for abscess. Although the origin could be    from the sigmoid colon (diverticulitis), the center of the inflammation is not clearly surrounding the sigmoid colon itself. No air within the bladder to suggest colovesical fistula. Urologic consultation recommended.      Additional incidental findings as above.      The study was marked in EPIC for immediate notification.         Workstation performed: [id number]             Procedures    ED Medication and Procedure Management   Prior to Admission Medications   Prescriptions Last Dose Informant Patient Reported? Taking?   Diclofenac Sodium (VOLTAREN) 1 % Not Taking  No No   Sig: Apply 2 g topically 4 (four) times a day   Patient not taking: Reported on 7/18/2025   PARoxetine (PAXIL) 40 MG tablet Not Taking  No No   Sig: TAKE 1 TABLET (40 MG TOTAL) BY MOUTH AS NEEDED   Patient not taking: Reported on 7/18/2025   albuterol (2.5 mg/3 mL) 0.083 % nebulizer solution   No Yes   Sig: Take 1 vial (2.5 mg total) by nebulization every 6 (six) hours as needed for wheezing or shortness of breath   albuterol (ProAir HFA) 90 mcg/act inhaler   No Yes    Si puffs every 6 hours as needed for cough and shortness of breath   fluticasone-salmeterol (Advair Diskus) 250-50 mcg/dose inhaler   No Yes   Sig: Inhale 1 puff 2 (two) times a day Rinse mouth after use.   ibuprofen (MOTRIN) 600 mg tablet   No Yes   Sig: Take 1 tablet (600 mg total) by mouth every 6 (six) hours as needed for moderate pain   lidocaine (Lidoderm) 5 % Not Taking  No No   Sig: Apply 1 patch topically over 12 hours daily Remove & Discard patch within 12 hours or as directed by MD   Patient not taking: Reported on 2025   methocarbamol (ROBAXIN) 500 mg tablet Not Taking  No No   Sig: Take 1 tablet (500 mg total) by mouth 2 (two) times a day   Patient not taking: Reported on 2025   montelukast (SINGULAIR) 10 mg tablet Not Taking  No No   Sig: Take 1 tablet (10 mg total) by mouth daily at bedtime   Patient not taking: Reported on 2025   naproxen (NAPROSYN) 500 mg tablet Not Taking  No No   Sig: Take 1 tablet (500 mg total) by mouth 2 (two) times a day with meals   Patient not taking: Reported on 2025      Facility-Administered Medications: None     Patient's Medications   Discharge Prescriptions    SULFAMETHOXAZOLE-TRIMETHOPRIM (BACTRIM DS) 800-160 MG PER TABLET    Take 1 tablet by mouth 2 (two) times a day for 10 days smx-tmp DS (BACTRIM) 800-160 mg tabs (1tab q12 D10)       Start Date: 2025 End Date: 2025       Order Dose: 1 tablet       Quantity: 20 tablet    Refills: 0       ED SEPSIS DOCUMENTATION   Time reflects when diagnosis was documented in both MDM as applicable and the Disposition within this note       Time User Action Codes Description Comment    2025  9:29 PM Luis Alberto Okeefe [N30.90] Cystitis     2025  9:29 PM Luis Alberto Okeefe [N30.80] Abscess of bladder     2025  9:31 PM Luis Alberto Okeefe [E87.6] Hypokalemia     2025  9:31 PM Luis Alberto Okeefe [R10.9] Abdominal pain                      [1]   Past Medical  History:  Diagnosis Date    Asthma     Shoulder dislocation    [2]   Past Surgical History:  Procedure Laterality Date    SHOULDER ARTHROSCOPY      SHOULDER SURGERY     [3]   Family History  Problem Relation Name Age of Onset    Asthma Mother      No Known Problems Father      No Known Problems Sister      No Known Problems Brother      No Known Problems Sister     [4]   Social History  Tobacco Use    Smoking status: Never    Smokeless tobacco: Never   Vaping Use    Vaping status: Never Used   Substance Use Topics    Alcohol use: Not Currently     Comment: occ    Drug use: No        Luis Alberto Okeefe PA-C  07/18/25 [id number]

## 2025-07-18 NOTE — Clinical Note
Christiano Moody was seen and treated in our emergency department on 7/18/2025.    No restrictions            Diagnosis:     Christiano  .    He may return on this date: 07/21/2025         If you have any questions or concerns, please don't hesitate to call.      Luis Alberto Okeefe PA-C    ______________________________           _______________          _______________  Hospital Representative                              Date                                Time

## 2025-07-18 NOTE — ED NOTES
Patient oxygen dipping to 88% to 87% on room air. Patient placed on 2 liters of oxygen per nasal cannula, saturations increased to 94%. Provider made aware.      Lisa Montano RN  07/18/25 1957

## 2025-07-19 RX ORDER — PHENAZOPYRIDINE HYDROCHLORIDE 200 MG/1
200 TABLET, FILM COATED ORAL 3 TIMES DAILY
Qty: 6 TABLET | Refills: 0 | Status: SHIPPED | OUTPATIENT
Start: 2025-07-19

## 2025-07-19 RX ORDER — SULFAMETHOXAZOLE AND TRIMETHOPRIM 800; 160 MG/1; MG/1
1 TABLET ORAL 2 TIMES DAILY
Qty: 20 TABLET | Refills: 0 | Status: SHIPPED | OUTPATIENT
Start: 2025-07-19 | End: 2025-07-29

## 2025-07-19 RX ORDER — NAPROXEN 500 MG/1
500 TABLET ORAL 2 TIMES DAILY WITH MEALS
Qty: 30 TABLET | Refills: 0 | Status: SHIPPED | OUTPATIENT
Start: 2025-07-19

## 2025-07-19 NOTE — ED PROVIDER NOTES
This note is being written on 7/19/2025 at approximately 3:25 PM      Patient called the emergency department stating that he is still having significant dysuria.  I advised him that he could seek reevaluation in the emergency department or I can call him in prescriptions to the pharmacy to help with his symptoms.  He elects to have medication sent.  Prescriptions for naproxen and Pyridium sent to the [facility] pharmacy per patient request.  I did advise him that if these medications do not improve his symptoms that he should return to the emergency department for repeat evaluation.  He expressed understanding was thankful.     Grady Baumann MD  07/19/25 [id number]

## 2025-07-19 NOTE — ED CARE HANDOFF
"Emergency Department Sign Out Note        Sign out and transfer of care from Luis Alberto Okeefe PA-C. See Separate Emergency Department note.     The patient, Christiano Moody, was evaluated by the previous provider for \"Patient is a 49-year-old male coming in for evaluation of lower abdominal pain, slight dysuria, increased frequency. Denies any concern for STDs at this time, no discharge, no fevers. Has not take anything for pain at this time. Reports that this all started yesterday, but has gotten worse today. Did take some Motrin yesterday, with minimal relief. Reports that he is concerned for prostatitis, after performing a Google search. Reports he has pain with defecation as well. No history of kidney stones. Patient appears uncomfortable at this time \".    Workup Completed:  Labs Reviewed   COMPREHENSIVE METABOLIC PANEL - Abnormal       Result Value Ref Range Status    Sodium 137  135 - 147 mmol/L Final    Potassium 2.9 (*) 3.5 - 5.3 mmol/L Final    Chloride 97  96 - 108 mmol/L Final    CO2 28  21 - 32 mmol/L Final    ANION GAP 12  4 - 13 mmol/L Final    BUN 11  5 - 25 mg/dL Final    Creatinine 0.93  0.60 - 1.30 mg/dL Final    Comment: Standardized to IDMS reference method    Glucose 90  65 - 140 mg/dL Final    Comment: If the patient is fasting, the ADA then defines impaired fasting glucose as > 100 mg/dL and diabetes as > or equal to 123 mg/dL.    Calcium 8.9  8.4 - 10.2 mg/dL Final    AST 17  13 - 39 U/L Final    ALT 37  7 - 52 U/L Final    Comment: Specimen collection should occur prior to Sulfasalazine administration due to the potential for falsely depressed results.     Alkaline Phosphatase 93  34 - 104 U/L Final    Total Protein 8.5 (*) 6.4 - 8.4 g/dL Final    Albumin 4.2  3.5 - 5.0 g/dL Final    Total Bilirubin 0.66  0.20 - 1.00 mg/dL Final    Comment: Use of this assay is not recommended for patients undergoing treatment with eltrombopag due to the potential for falsely elevated " results.  N-acetyl-p-benzoquinone imine (metabolite of Acetaminophen) will generate erroneously low results in samples for patients that have taken an overdose of Acetaminophen.    eGFR 94  ml/min/1.73sq m Final    Narrative:     National Kidney Disease Foundation guidelines for Chronic Kidney Disease (CKD):     Stage 1 with normal or high GFR (GFR > 90 mL/min/1.73 square meters)    Stage 2 Mild CKD (GFR = 60-89 mL/min/1.73 square meters)    Stage 3A Moderate CKD (GFR = 45-59 mL/min/1.73 square meters)    Stage 3B Moderate CKD (GFR = 30-44 mL/min/1.73 square meters)    Stage 4 Severe CKD (GFR = 15-29 mL/min/1.73 square meters)    Stage 5 End Stage CKD (GFR <15 mL/min/1.73 square meters)  Note: GFR calculation is accurate only with a steady state creatinine   LIPASE - Abnormal    Lipase 8 (*) 11 - 82 u/L Final   UA W REFLEX TO MICROSCOPIC WITH REFLEX TO CULTURE - Abnormal    Color, UA Brown (*) Straw, Yellow, Pale Yellow Final    Clarity, UA Cloudy (*) Clear, Other Final    Specific Gravity, UA 1.015  1.003 - 1.040 Final    pH, UA 6.0  4.5, 5.0, 5.5, 6.0, 6.5, 7.0, 7.5, 8.0 Final    Leukocytes, .0 (*) Negative Final    Nitrite, UA Negative  Negative Final    Protein, UA >=500 (*) Negative mg/dl Final    Glucose, UA Negative  Negative mg/dl Final    Ketones, UA 50 (2+) (*) Negative mg/dl Final    Bilirubin, UA Negative  Negative Final    Occult Blood, .0 (*) Negative Final    UROBILINOGEN UA 4.0 (*) 1.0, Negative mg/dL Final   URINE MICROSCOPIC - Abnormal    RBC, UA Innumerable (*) None Seen, 0-1, 1-2, 2-4, 0-5 /hpf Final    WBC, UA Innumerable (*) None Seen, 0-1, 1-2, 0-5, 2-4 /hpf Final    Epithelial Cells Occasional  None Seen, Occasional /hpf Final    Bacteria, UA Field obscured, unable to enumerate (*) None Seen, Occasional /hpf Final   MAGNESIUM - Normal    Magnesium 1.9  1.9 - 2.7 mg/dL Final   URINE CULTURE   CBC AND DIFFERENTIAL    WBC 9.74  4.31 - 10.16 Thousand/uL Final    RBC 4.58  3.88 - 5.62  Million/uL Final    Hemoglobin 13.4  12.0 - 17.0 g/dL Final    Hematocrit 40.8  36.5 - 49.3 % Final    MCV 89  82 - 98 fL Final    MCH 29.3  26.8 - 34.3 pg Final    MCHC 32.8  31.4 - 37.4 g/dL Final    RDW 14.3  11.6 - 15.1 % Final    MPV 9.5  8.9 - 12.7 fL Final    Platelets 304  149 - 390 Thousands/uL Final    nRBC 0  /100 WBCs Final    Segmented % 75  43 - 75 % Final    Immature Grans % 0  0 - 2 % Final    Lymphocytes % 15  14 - 44 % Final    Monocytes % 9  4 - 12 % Final    Eosinophils Relative 1  0 - 6 % Final    Basophils Relative 0  0 - 1 % Final    Absolute Neutrophils 7.28  1.85 - 7.62 Thousands/µL Final    Absolute Immature Grans 0.03  0.00 - 0.20 Thousand/uL Final    Absolute Lymphocytes 1.49  0.60 - 4.47 Thousands/µL Final    Absolute Monocytes 0.85  0.17 - 1.22 Thousand/µL Final    Eosinophils Absolute 0.06  0.00 - 0.61 Thousand/µL Final    Basophils Absolute 0.03  0.00 - 0.10 Thousands/µL Final      CT abdomen pelvis with contrast   Final Result      Marked urinary bladder wall thickening with perivesical edema/inflammation consistent with cystitis. 2.5 cm slightly thick-walled fluid collection along the bladder dome abutting the sigmoid colon, suspicious for abscess. Although the origin could be    from the sigmoid colon (diverticulitis), the center of the inflammation is not clearly surrounding the sigmoid colon itself. No air within the bladder to suggest colovesical fistula. Urologic consultation recommended.      Additional incidental findings as above.      The study was marked in EPIC for immediate notification.         Workstation performed: [id number]              ED Course / Workup Pending (followup):  Pending completion of IV ceftriaxone for treatment of acute cystitis and abscess, then discharged        No data recorded                             Procedures  Medical Decision Making    Patient will receive dose of Rocephin here, as well as Bactrim, before being transition tomorrow to 10 days  of Bactrim being picked up.  Patient also does some have some hypokalemia, did recommend the patient keep eating, at this point does not require further oral potassium pills.  Patient reports understanding, and has no further questions.  Patient's lab work otherwise is generally within normal limits, no sign of endorgan damage, no CVA tenderness, no concern for pyelocurrently.  Patient is nontoxic, is safe for discharge.         Amount and/or Complexity of Data Reviewed  Labs: ordered.  Radiology: ordered.    Risk  Prescription drug management.            Disposition  Final diagnoses:   Cystitis   Abscess of bladder   Hypokalemia   Abdominal pain     Time reflects when diagnosis was documented in both MDM as applicable and the Disposition within this note       Time User Action Codes Description Comment    7/18/2025  9:29 PM Luis Alberto Okeefe [N30.90] Cystitis     7/18/2025  9:29 PM Luis Alberto Okeefe [N30.80] Abscess of bladder     7/18/2025  9:31 PM Luis Alberto Okeefe [E87.6] Hypokalemia     7/18/2025  9:31 PM Luis Alberto Okeefe [R10.9] Abdominal pain           ED Disposition       ED Disposition   Discharge    Condition   Stable    Date/Time   Fri Jul 18, 2025  9:29 PM    Comment   Christiano Moody discharge to home/self care.                   Follow-up Information       Follow up With Specialties Details Why Contact Info Additional Information    Martita Gallardo DO Family Medicine   [address] [facility]  [address] [place] [id number]  [phone]       [facility] Emergency Department Emergency Medicine  As needed, If symptoms worsen [address] [facility] [address] [phone] [facility] Emergency Department          Discharge Medication List as of 7/18/2025  9:31 PM        START taking these medications    Details   sulfamethoxazole-trimethoprim (BACTRIM DS) 800-160 mg per tablet Take 1 tablet by mouth 2 (two) times a day for 10 days  smx-tmp DS (BACTRIM) 800-160 mg tabs (1tab q12 D10), Starting Fri 7/18/2025, Until Mon 7/28/2025, Normal           CONTINUE these medications which have NOT CHANGED    Details   albuterol (2.5 mg/3 mL) 0.083 % nebulizer solution Take 1 vial (2.5 mg total) by nebulization every 6 (six) hours as needed for wheezing or shortness of breath, Starting Tue 6/23/2020, Normal      albuterol (ProAir HFA) 90 mcg/act inhaler 2 puffs every 6 hours as needed for cough and shortness of breath, Normal      fluticasone-salmeterol (Advair Diskus) 250-50 mcg/dose inhaler Inhale 1 puff 2 (two) times a day Rinse mouth after use., Starting Tue 7/21/2020, Normal      ibuprofen (MOTRIN) 600 mg tablet Take 1 tablet (600 mg total) by mouth every 6 (six) hours as needed for moderate pain, Starting Sun 9/12/2021, Print      Diclofenac Sodium (VOLTAREN) 1 % Apply 2 g topically 4 (four) times a day, Starting Tue 5/2/2023, Normal      lidocaine (Lidoderm) 5 % Apply 1 patch topically over 12 hours daily Remove & Discard patch within 12 hours or as directed by MD, Starting Tue 5/2/2023, Normal      methocarbamol (ROBAXIN) 500 mg tablet Take 1 tablet (500 mg total) by mouth 2 (two) times a day, Starting Tue 5/2/2023, Normal      montelukast (SINGULAIR) 10 mg tablet Take 1 tablet (10 mg total) by mouth daily at bedtime, Starting Tue 7/21/2020, Normal      naproxen (NAPROSYN) 500 mg tablet Take 1 tablet (500 mg total) by mouth 2 (two) times a day with meals, Starting Tue 5/2/2023, Normal      PARoxetine (PAXIL) 40 MG tablet TAKE 1 TABLET (40 MG TOTAL) BY MOUTH AS NEEDED, Normal                  ED Provider  Electronically Signed by     Flip Juarez PA-C  07/18/25 [id number]

## 2025-07-20 LAB — BACTERIA UR CULT: ABNORMAL

## 2025-07-21 ENCOUNTER — TELEPHONE (OUTPATIENT)
Dept: FAMILY MEDICINE CLINIC | Facility: CLINIC | Age: 50
End: 2025-07-21

## 2025-07-21 NOTE — LETTER
[facility]  [address]  [address]  [facility] [id number] [phone]    Date: 07/21/25    Christiano Moody  [address] [place] [facility] [address]    Dear Christiano:    Your primary care provider, Martita Gallardo DO, [facility], is committed to providing you with quality health care and we consider it a privilege to be your health care provider.  We have not seen you in the office since 2020 and have been attempting to contact you to schedule your annual physical.  Your primary care provider wants to ensure your ongoing medical care is being addressed.  Please call the office [phone] to re-establish care and schedule your annual physical today.  Yearly physicals are a key component in maintaining good health.  If you have established care with a different primary care provider, please call our office to notify us of this change.  We encourage you to call the number on the back of your insurance card to change your primary care physician with your insurance company as well.   We thank you for choosing [facility] for your healthcare needs.  Sincerely,  Che Kimball  [facility]  [phone]

## 2025-07-21 NOTE — TELEPHONE ENCOUNTER
LOV 2020     Called and left message to schedule or update pcp.     [facility] letter sent.    Please remove Martita Gallardo DO as Primary Care Provider.        6 (moderate pain)

## 2025-08-20 ENCOUNTER — DOCUMENTATION (OUTPATIENT)
Dept: ADMINISTRATIVE | Facility: OTHER | Age: 50
End: 2025-08-20